# Patient Record
Sex: FEMALE | Race: ASIAN | NOT HISPANIC OR LATINO | URBAN - METROPOLITAN AREA
[De-identification: names, ages, dates, MRNs, and addresses within clinical notes are randomized per-mention and may not be internally consistent; named-entity substitution may affect disease eponyms.]

---

## 2018-01-31 ENCOUNTER — EMERGENCY (EMERGENCY)
Facility: HOSPITAL | Age: 28
LOS: 1 days | Discharge: HOME | End: 2018-01-31
Attending: EMERGENCY MEDICINE
Payer: SELF-PAY

## 2018-01-31 VITALS
RESPIRATION RATE: 16 BRPM | TEMPERATURE: 98 F | HEART RATE: 85 BPM | SYSTOLIC BLOOD PRESSURE: 110 MMHG | OXYGEN SATURATION: 100 % | DIASTOLIC BLOOD PRESSURE: 60 MMHG

## 2018-01-31 VITALS
WEIGHT: 149.91 LBS | DIASTOLIC BLOOD PRESSURE: 83 MMHG | TEMPERATURE: 98 F | OXYGEN SATURATION: 100 % | RESPIRATION RATE: 18 BRPM | HEIGHT: 64 IN | HEART RATE: 109 BPM | SYSTOLIC BLOOD PRESSURE: 128 MMHG

## 2018-01-31 DIAGNOSIS — Z98.890 OTHER SPECIFIED POSTPROCEDURAL STATES: Chronic | ICD-10-CM

## 2018-01-31 LAB
ANION GAP SERPL CALC-SCNC: 8 MMOL/L — SIGNIFICANT CHANGE UP (ref 5–17)
APPEARANCE UR: SIGNIFICANT CHANGE UP
BILIRUB UR-MCNC: ABNORMAL
BUN SERPL-MCNC: 5 MG/DL — LOW (ref 7–18)
CALCIUM SERPL-MCNC: 8.6 MG/DL — SIGNIFICANT CHANGE UP (ref 8.4–10.5)
CHLORIDE SERPL-SCNC: 105 MMOL/L — SIGNIFICANT CHANGE UP (ref 96–108)
CO2 SERPL-SCNC: 23 MMOL/L — SIGNIFICANT CHANGE UP (ref 22–31)
COLOR SPEC: YELLOW — SIGNIFICANT CHANGE UP
CREAT SERPL-MCNC: 0.5 MG/DL — SIGNIFICANT CHANGE UP (ref 0.5–1.3)
DIFF PNL FLD: ABNORMAL
GLUCOSE SERPL-MCNC: 92 MG/DL — SIGNIFICANT CHANGE UP (ref 70–99)
GLUCOSE UR QL: NEGATIVE — SIGNIFICANT CHANGE UP
HCG SERPL-ACNC: SIGNIFICANT CHANGE UP MIU/ML
HCG UR QL: POSITIVE
HCT VFR BLD CALC: 37.8 % — SIGNIFICANT CHANGE UP (ref 34.5–45)
HGB BLD-MCNC: 12.3 G/DL — SIGNIFICANT CHANGE UP (ref 11.5–15.5)
KETONES UR-MCNC: ABNORMAL
LEUKOCYTE ESTERASE UR-ACNC: ABNORMAL
MCHC RBC-ENTMCNC: 28.8 PG — SIGNIFICANT CHANGE UP (ref 27–34)
MCHC RBC-ENTMCNC: 32.6 GM/DL — SIGNIFICANT CHANGE UP (ref 32–36)
MCV RBC AUTO: 88.3 FL — SIGNIFICANT CHANGE UP (ref 80–100)
NITRITE UR-MCNC: NEGATIVE — SIGNIFICANT CHANGE UP
PH UR: 6.5 — SIGNIFICANT CHANGE UP (ref 5–8)
PLATELET # BLD AUTO: 306 K/UL — SIGNIFICANT CHANGE UP (ref 150–400)
POTASSIUM SERPL-MCNC: 3.4 MMOL/L — LOW (ref 3.5–5.3)
POTASSIUM SERPL-SCNC: 3.4 MMOL/L — LOW (ref 3.5–5.3)
PROT UR-MCNC: 15
RBC # BLD: 4.28 M/UL — SIGNIFICANT CHANGE UP (ref 3.8–5.2)
RBC # FLD: 12 % — SIGNIFICANT CHANGE UP (ref 10.3–14.5)
SODIUM SERPL-SCNC: 136 MMOL/L — SIGNIFICANT CHANGE UP (ref 135–145)
SP GR SPEC: 1.02 — SIGNIFICANT CHANGE UP (ref 1.01–1.02)
UROBILINOGEN FLD QL: 4
WBC # BLD: 9.9 K/UL — SIGNIFICANT CHANGE UP (ref 3.8–10.5)
WBC # FLD AUTO: 9.9 K/UL — SIGNIFICANT CHANGE UP (ref 3.8–10.5)

## 2018-01-31 PROCEDURE — 81025 URINE PREGNANCY TEST: CPT

## 2018-01-31 PROCEDURE — 80048 BASIC METABOLIC PNL TOTAL CA: CPT

## 2018-01-31 PROCEDURE — 76817 TRANSVAGINAL US OBSTETRIC: CPT

## 2018-01-31 PROCEDURE — 86900 BLOOD TYPING SEROLOGIC ABO: CPT

## 2018-01-31 PROCEDURE — 99285 EMERGENCY DEPT VISIT HI MDM: CPT

## 2018-01-31 PROCEDURE — 81001 URINALYSIS AUTO W/SCOPE: CPT

## 2018-01-31 PROCEDURE — 85027 COMPLETE CBC AUTOMATED: CPT

## 2018-01-31 PROCEDURE — 76815 OB US LIMITED FETUS(S): CPT | Mod: 26

## 2018-01-31 PROCEDURE — 76817 TRANSVAGINAL US OBSTETRIC: CPT | Mod: 26

## 2018-01-31 PROCEDURE — 86850 RBC ANTIBODY SCREEN: CPT

## 2018-01-31 PROCEDURE — 99284 EMERGENCY DEPT VISIT MOD MDM: CPT | Mod: 25

## 2018-01-31 PROCEDURE — 36415 COLL VENOUS BLD VENIPUNCTURE: CPT

## 2018-01-31 PROCEDURE — 84702 CHORIONIC GONADOTROPIN TEST: CPT

## 2018-01-31 PROCEDURE — 86901 BLOOD TYPING SEROLOGIC RH(D): CPT

## 2018-01-31 PROCEDURE — 76801 OB US < 14 WKS SINGLE FETUS: CPT | Mod: 26

## 2018-01-31 PROCEDURE — 76801 OB US < 14 WKS SINGLE FETUS: CPT

## 2018-01-31 RX ORDER — DOXYLAMINE SUCCINATE AND PYRIDOXINE HYDROCHLORIDE, DELAYED RELEASE TABLETS 10 MG/10 MG 10; 10 MG/1; MG/1
2 TABLET, DELAYED RELEASE ORAL
Qty: 6 | Refills: 0 | OUTPATIENT
Start: 2018-01-31 | End: 2018-02-02

## 2018-01-31 RX ORDER — ONDANSETRON 8 MG/1
4 TABLET, FILM COATED ORAL ONCE
Qty: 0 | Refills: 0 | Status: COMPLETED | OUTPATIENT
Start: 2018-01-31 | End: 2018-01-31

## 2018-01-31 RX ORDER — ONDANSETRON 8 MG/1
1 TABLET, FILM COATED ORAL
Qty: 9 | Refills: 0 | OUTPATIENT
Start: 2018-01-31 | End: 2018-02-02

## 2018-01-31 RX ADMIN — ONDANSETRON 4 MILLIGRAM(S): 8 TABLET, FILM COATED ORAL at 19:51

## 2018-01-31 NOTE — ED PROVIDER NOTE - PROGRESS NOTE DETAILS
x2 IUP at 7wks, will dc home with OB/GYN follow up. HCG >200,000, RHPOS, no UTI, pt vomited x1 while in ED, denies lightheaded rx diclegis, no insurance d/c and sent zofran, placed with Cassandra for follow up.

## 2018-01-31 NOTE — ED PROVIDER NOTE - MEDICAL DECISION MAKING DETAILS
27 y/o F pt presents with lower abd pain, fever, and vomiting. Pt is well-appearing, vital signs stable, afebrile. Sx's suggestive of possible pregnancy. Will check urine, and if positive, will send flu swab. Will also perform sonogram as pt with no previous confirmed IUP; will r/o ectopic for lower abdominal cramping.

## 2018-01-31 NOTE — ED PROVIDER NOTE - ATTENDING CONTRIBUTION TO CARE
28 year old female no PMHx c/o abdominal discomfort and vaginal spotting x one week. PE: NAD, mild suprapubic tenderness. I&P: threatened , bedrest, OB/GYN follow up 28 year old female no PMHx c/o abdominal discomfort and vaginal spotting x one week. PE: NAD, mild suprapubic tenderness. I&P: threatened , bedrest, OB/GYN follow up.

## 2018-01-31 NOTE — ED ADULT TRIAGE NOTE - CHIEF COMPLAINT QUOTE
C/o "I am vomiting and fever, lower abdominal pain and back pain". LMP " first week in December, I had a little spotting in between".

## 2018-01-31 NOTE — ED PROVIDER NOTE - OBJECTIVE STATEMENT
29 y/o F pt with no significant PMHx and PSHx of D&C (x2; approximately x5 years ago, performed in [place]) presents with  to ED c/o subjective fever and vomiting with associated lower abdominal pain x3 days. Pt additionally reports vaginal spotting x1 week ago, which has since resolved. Pt describes vomiting as worse in the morning; pt describes her abdominal pain as cramping in nature. Pt notes possibility of pregnancy. Pt denies vaginal pain, dysuria, hematuria, cough, b/l ear pain, throat pain, or any other complaints. Pt also denies Hx of DM, Hx of HTN, having received the flu vaccination this year, currently taking birth control PO daily, or having recently performed a pregnancy test. LMP: early December of 2017; pt notes her menstrual cycle is usually regular. NKDA.

## 2018-01-31 NOTE — ED PROVIDER NOTE - CARE PLAN
Principal Discharge DX:	Vaginal bleeding in pregnancy, first trimester Principal Discharge DX:	Abdominal cramping, bilateral lower quadrant  Secondary Diagnosis:	Pregnancy

## 2018-03-05 ENCOUNTER — RESULT REVIEW (OUTPATIENT)
Age: 28
End: 2018-03-05

## 2018-03-05 ENCOUNTER — OUTPATIENT (OUTPATIENT)
Dept: OUTPATIENT SERVICES | Facility: HOSPITAL | Age: 28
LOS: 1 days | End: 2018-03-05
Payer: MEDICAID

## 2018-03-05 ENCOUNTER — APPOINTMENT (OUTPATIENT)
Dept: OBGYN | Facility: CLINIC | Age: 28
End: 2018-03-05
Payer: MEDICAID

## 2018-03-05 ENCOUNTER — NON-APPOINTMENT (OUTPATIENT)
Age: 28
End: 2018-03-05

## 2018-03-05 VITALS
DIASTOLIC BLOOD PRESSURE: 84 MMHG | HEIGHT: 62 IN | WEIGHT: 138 LBS | SYSTOLIC BLOOD PRESSURE: 126 MMHG | BODY MASS INDEX: 25.4 KG/M2

## 2018-03-05 DIAGNOSIS — Z34.00 ENCOUNTER FOR SUPERVISION OF NORMAL FIRST PREGNANCY, UNSPECIFIED TRIMESTER: ICD-10-CM

## 2018-03-05 DIAGNOSIS — Z81.8 FAMILY HISTORY OF OTHER MENTAL AND BEHAVIORAL DISORDERS: ICD-10-CM

## 2018-03-05 DIAGNOSIS — Z82.49 FAMILY HISTORY OF ISCHEMIC HEART DISEASE AND OTHER DISEASES OF THE CIRCULATORY SYSTEM: ICD-10-CM

## 2018-03-05 DIAGNOSIS — E03.9 HYPOTHYROIDISM, UNSPECIFIED: ICD-10-CM

## 2018-03-05 DIAGNOSIS — Z98.890 OTHER SPECIFIED POSTPROCEDURAL STATES: Chronic | ICD-10-CM

## 2018-03-05 LAB
APPEARANCE UR: ABNORMAL
BACTERIA # UR AUTO: ABNORMAL
BASOPHILS # BLD AUTO: 0.02 K/UL — SIGNIFICANT CHANGE UP (ref 0–0.2)
BASOPHILS NFR BLD AUTO: 0.3 % — SIGNIFICANT CHANGE UP (ref 0–2)
BILIRUB UR-MCNC: NEGATIVE — SIGNIFICANT CHANGE UP
COLOR SPEC: YELLOW — SIGNIFICANT CHANGE UP
COMMENT - URINE: SIGNIFICANT CHANGE UP
DIFF PNL FLD: NEGATIVE — SIGNIFICANT CHANGE UP
EOSINOPHIL # BLD AUTO: 0.05 K/UL — SIGNIFICANT CHANGE UP (ref 0–0.5)
EOSINOPHIL NFR BLD AUTO: 0.6 % — SIGNIFICANT CHANGE UP (ref 0–6)
EPI CELLS # UR: 20 /HPF — HIGH (ref 0–5)
GLUCOSE UR QL: NEGATIVE MG/DL — SIGNIFICANT CHANGE UP
HBA1C BLD-MCNC: 4.9 % — SIGNIFICANT CHANGE UP (ref 4–5.6)
HBV SURFACE AG SER-ACNC: SIGNIFICANT CHANGE UP
HCT VFR BLD CALC: 33.5 % — LOW (ref 34.5–45)
HGB BLD-MCNC: 11.3 G/DL — LOW (ref 11.5–15.5)
HYALINE CASTS # UR AUTO: 0 /LPF — SIGNIFICANT CHANGE UP (ref 0–7)
IMM GRANULOCYTES NFR BLD AUTO: 0.3 % — SIGNIFICANT CHANGE UP (ref 0–1.5)
KETONES UR-MCNC: NEGATIVE — SIGNIFICANT CHANGE UP
LEUKOCYTE ESTERASE UR-ACNC: ABNORMAL
LYMPHOCYTES # BLD AUTO: 1.79 K/UL — SIGNIFICANT CHANGE UP (ref 1–3.3)
LYMPHOCYTES # BLD AUTO: 22.6 % — SIGNIFICANT CHANGE UP (ref 13–44)
MCHC RBC-ENTMCNC: 29.4 PG — SIGNIFICANT CHANGE UP (ref 27–34)
MCHC RBC-ENTMCNC: 33.7 GM/DL — SIGNIFICANT CHANGE UP (ref 32–36)
MCV RBC AUTO: 87 FL — SIGNIFICANT CHANGE UP (ref 80–100)
MONOCYTES # BLD AUTO: 0.57 K/UL — SIGNIFICANT CHANGE UP (ref 0–0.9)
MONOCYTES NFR BLD AUTO: 7.2 % — SIGNIFICANT CHANGE UP (ref 2–14)
NEUTROPHILS # BLD AUTO: 5.46 K/UL — SIGNIFICANT CHANGE UP (ref 1.8–7.4)
NEUTROPHILS NFR BLD AUTO: 69 % — SIGNIFICANT CHANGE UP (ref 43–77)
NITRITE UR-MCNC: NEGATIVE — SIGNIFICANT CHANGE UP
PH UR: 7 — SIGNIFICANT CHANGE UP (ref 5–8)
PLATELET # BLD AUTO: 286 K/UL — SIGNIFICANT CHANGE UP (ref 150–400)
PROT UR-MCNC: NEGATIVE MG/DL — SIGNIFICANT CHANGE UP
RBC # BLD: 3.85 M/UL — SIGNIFICANT CHANGE UP (ref 3.8–5.2)
RBC # FLD: 13.8 % — SIGNIFICANT CHANGE UP (ref 10.3–14.5)
RBC CASTS # UR COMP ASSIST: 5 /HPF — HIGH (ref 0–4)
RUBV IGG SER-ACNC: 2.8 INDEX — SIGNIFICANT CHANGE UP
RUBV IGG SER-IMP: POSITIVE — SIGNIFICANT CHANGE UP
SP GR SPEC: 1.01 — LOW (ref 1.01–1.02)
T PALLIDUM AB TITR SER: NEGATIVE — SIGNIFICANT CHANGE UP
T4 FREE SERPL-MCNC: 1.6 NG/DL — SIGNIFICANT CHANGE UP (ref 0.9–1.8)
TSH SERPL-MCNC: 0.14 UIU/ML — LOW (ref 0.27–4.2)
UROBILINOGEN FLD QL: NEGATIVE MG/DL — SIGNIFICANT CHANGE UP
VZV IGG FLD QL IA: 400.6 INDEX — SIGNIFICANT CHANGE UP
VZV IGG FLD QL IA: POSITIVE — SIGNIFICANT CHANGE UP
WBC # BLD: 7.91 K/UL — SIGNIFICANT CHANGE UP (ref 3.8–10.5)
WBC # FLD AUTO: 7.91 K/UL — SIGNIFICANT CHANGE UP (ref 3.8–10.5)
WBC UR QL: 8 /HPF — HIGH (ref 0–5)

## 2018-03-05 PROCEDURE — 86850 RBC ANTIBODY SCREEN: CPT

## 2018-03-05 PROCEDURE — 81001 URINALYSIS AUTO W/SCOPE: CPT

## 2018-03-05 PROCEDURE — 99203 OFFICE O/P NEW LOW 30 MIN: CPT | Mod: NC

## 2018-03-05 PROCEDURE — 86787 VARICELLA-ZOSTER ANTIBODY: CPT

## 2018-03-05 PROCEDURE — 86901 BLOOD TYPING SEROLOGIC RH(D): CPT

## 2018-03-05 PROCEDURE — 81099 UNLISTED URINALYSIS PX: CPT | Mod: NC

## 2018-03-05 PROCEDURE — 81220 CFTR GENE COM VARIANTS: CPT

## 2018-03-05 PROCEDURE — 81003 URINALYSIS AUTO W/O SCOPE: CPT

## 2018-03-05 PROCEDURE — 36415 COLL VENOUS BLD VENIPUNCTURE: CPT | Mod: NC

## 2018-03-05 PROCEDURE — 86780 TREPONEMA PALLIDUM: CPT

## 2018-03-05 PROCEDURE — 81025 URINE PREGNANCY TEST: CPT | Mod: NC

## 2018-03-05 PROCEDURE — 87389 HIV-1 AG W/HIV-1&-2 AB AG IA: CPT

## 2018-03-05 PROCEDURE — 85027 COMPLETE CBC AUTOMATED: CPT

## 2018-03-05 PROCEDURE — 88175 CYTOPATH C/V AUTO FLUID REDO: CPT

## 2018-03-05 PROCEDURE — 81025 URINE PREGNANCY TEST: CPT

## 2018-03-05 PROCEDURE — 86480 TB TEST CELL IMMUN MEASURE: CPT

## 2018-03-05 PROCEDURE — 87086 URINE CULTURE/COLONY COUNT: CPT

## 2018-03-05 PROCEDURE — 83655 ASSAY OF LEAD: CPT

## 2018-03-05 PROCEDURE — 84439 ASSAY OF FREE THYROXINE: CPT

## 2018-03-05 PROCEDURE — 36415 COLL VENOUS BLD VENIPUNCTURE: CPT

## 2018-03-05 PROCEDURE — 87340 HEPATITIS B SURFACE AG IA: CPT

## 2018-03-05 PROCEDURE — 86900 BLOOD TYPING SEROLOGIC ABO: CPT

## 2018-03-05 PROCEDURE — G0463: CPT

## 2018-03-05 PROCEDURE — 83020 HEMOGLOBIN ELECTROPHORESIS: CPT | Mod: 26

## 2018-03-05 PROCEDURE — 83036 HEMOGLOBIN GLYCOSYLATED A1C: CPT

## 2018-03-05 PROCEDURE — 83020 HEMOGLOBIN ELECTROPHORESIS: CPT

## 2018-03-05 PROCEDURE — 86762 RUBELLA ANTIBODY: CPT

## 2018-03-05 PROCEDURE — 84443 ASSAY THYROID STIM HORMONE: CPT

## 2018-03-06 DIAGNOSIS — Z34.01 ENCOUNTER FOR SUPERVISION OF NORMAL FIRST PREGNANCY, FIRST TRIMESTER: ICD-10-CM

## 2018-03-06 DIAGNOSIS — E03.9 HYPOTHYROIDISM, UNSPECIFIED: ICD-10-CM

## 2018-03-06 DIAGNOSIS — O30.009 TWIN PREGNANCY, UNSPECIFIED NUMBER OF PLACENTA AND UNSPECIFIED NUMBER OF AMNIOTIC SACS, UNSPECIFIED TRIMESTER: ICD-10-CM

## 2018-03-06 DIAGNOSIS — Z3A.12 12 WEEKS GESTATION OF PREGNANCY: ICD-10-CM

## 2018-03-06 LAB
C TRACH RRNA SPEC QL NAA+PROBE: SIGNIFICANT CHANGE UP
C TRACH+GC RRNA SPEC QL PROBE: SIGNIFICANT CHANGE UP
CULTURE RESULTS: SIGNIFICANT CHANGE UP
HIV 1+2 AB+HIV1 P24 AG SERPL QL IA: SIGNIFICANT CHANGE UP
N GONORRHOEA RRNA SPEC QL NAA+PROBE: SIGNIFICANT CHANGE UP
SPECIMEN SOURCE: SIGNIFICANT CHANGE UP

## 2018-03-07 LAB
HEMOGLOBIN INTERPRETATION: SIGNIFICANT CHANGE UP
HGB A MFR BLD: 96.7 % — SIGNIFICANT CHANGE UP (ref 95.8–98)
HGB A2 MFR BLD: 2.1 % — SIGNIFICANT CHANGE UP (ref 2–3.2)
HGB F MFR BLD: 1.2 % — HIGH (ref 0–1)
M TB TUBERC IFN-G BLD QL: 0.03 IU/ML — SIGNIFICANT CHANGE UP
M TB TUBERC IFN-G BLD QL: >10 IU/ML — SIGNIFICANT CHANGE UP
M TB TUBERC IFN-G BLD QL: POSITIVE
MITOGEN IGNF BCKGRD COR BLD-ACNC: >10 IU/ML — SIGNIFICANT CHANGE UP

## 2018-03-08 LAB — LEAD BLD-MCNC: 2 UG/DL — SIGNIFICANT CHANGE UP (ref 0–4)

## 2018-03-09 LAB — CYTOLOGY SPEC DOC CYTO: SIGNIFICANT CHANGE UP

## 2018-03-12 ENCOUNTER — ASOB RESULT (OUTPATIENT)
Age: 28
End: 2018-03-12

## 2018-03-12 ENCOUNTER — APPOINTMENT (OUTPATIENT)
Dept: ANTEPARTUM | Facility: CLINIC | Age: 28
End: 2018-03-12
Payer: MEDICAID

## 2018-03-12 LAB — CYSTIC FIBROSIS EXPANDED PANEL: SIGNIFICANT CHANGE UP

## 2018-03-12 PROCEDURE — 76814 OB US NUCHAL MEAS ADD-ON: CPT

## 2018-03-12 PROCEDURE — 76813 OB US NUCHAL MEAS 1 GEST: CPT

## 2018-03-12 PROCEDURE — 36416 COLLJ CAPILLARY BLOOD SPEC: CPT

## 2018-03-12 PROCEDURE — 76801 OB US < 14 WKS SINGLE FETUS: CPT

## 2018-03-12 PROCEDURE — 76802 OB US < 14 WKS ADDL FETUS: CPT

## 2018-03-12 PROCEDURE — 99203 OFFICE O/P NEW LOW 30 MIN: CPT | Mod: 25

## 2018-03-22 ENCOUNTER — TRANSCRIPTION ENCOUNTER (OUTPATIENT)
Age: 28
End: 2018-03-22

## 2018-04-02 ENCOUNTER — OUTPATIENT (OUTPATIENT)
Dept: OUTPATIENT SERVICES | Facility: HOSPITAL | Age: 28
LOS: 1 days | End: 2018-04-02
Payer: MEDICAID

## 2018-04-02 ENCOUNTER — NON-APPOINTMENT (OUTPATIENT)
Age: 28
End: 2018-04-02

## 2018-04-02 ENCOUNTER — APPOINTMENT (OUTPATIENT)
Dept: OBGYN | Facility: CLINIC | Age: 28
End: 2018-04-02
Payer: MEDICAID

## 2018-04-02 VITALS
WEIGHT: 142 LBS | DIASTOLIC BLOOD PRESSURE: 78 MMHG | HEIGHT: 62 IN | SYSTOLIC BLOOD PRESSURE: 115 MMHG | BODY MASS INDEX: 26.13 KG/M2

## 2018-04-02 DIAGNOSIS — Z3A.12 12 WEEKS GESTATION OF PREGNANCY: ICD-10-CM

## 2018-04-02 DIAGNOSIS — Z98.890 OTHER SPECIFIED POSTPROCEDURAL STATES: Chronic | ICD-10-CM

## 2018-04-02 DIAGNOSIS — Z34.01 ENCOUNTER FOR SUPERVISION OF NORMAL FIRST PREGNANCY, FIRST TRIMESTER: ICD-10-CM

## 2018-04-02 DIAGNOSIS — Z34.00 ENCOUNTER FOR SUPERVISION OF NORMAL FIRST PREGNANCY, UNSPECIFIED TRIMESTER: ICD-10-CM

## 2018-04-02 LAB
ALBUMIN SERPL ELPH-MCNC: 3.8 G/DL — SIGNIFICANT CHANGE UP (ref 3.3–5)
ALP SERPL-CCNC: 67 U/L — SIGNIFICANT CHANGE UP (ref 40–120)
ALT FLD-CCNC: 21 U/L — SIGNIFICANT CHANGE UP (ref 10–45)
ANION GAP SERPL CALC-SCNC: 11 MMOL/L — SIGNIFICANT CHANGE UP (ref 5–17)
AST SERPL-CCNC: 24 U/L — SIGNIFICANT CHANGE UP (ref 10–40)
BILIRUB SERPL-MCNC: 0.2 MG/DL — SIGNIFICANT CHANGE UP (ref 0.2–1.2)
BUN SERPL-MCNC: 5 MG/DL — LOW (ref 7–23)
CALCIUM SERPL-MCNC: 9.8 MG/DL — SIGNIFICANT CHANGE UP (ref 8.4–10.5)
CHLORIDE SERPL-SCNC: 104 MMOL/L — SIGNIFICANT CHANGE UP (ref 96–108)
CO2 SERPL-SCNC: 23 MMOL/L — SIGNIFICANT CHANGE UP (ref 22–31)
CREAT SERPL-MCNC: 0.56 MG/DL — SIGNIFICANT CHANGE UP (ref 0.5–1.3)
GLUCOSE SERPL-MCNC: 74 MG/DL — SIGNIFICANT CHANGE UP (ref 70–99)
INR BLD: 1.01 RATIO — SIGNIFICANT CHANGE UP (ref 0.88–1.16)
LDH SERPL L TO P-CCNC: 176 U/L — SIGNIFICANT CHANGE UP (ref 50–242)
POTASSIUM SERPL-MCNC: 3.7 MMOL/L — SIGNIFICANT CHANGE UP (ref 3.5–5.3)
POTASSIUM SERPL-SCNC: 3.7 MMOL/L — SIGNIFICANT CHANGE UP (ref 3.5–5.3)
PROT SERPL-MCNC: 7.1 G/DL — SIGNIFICANT CHANGE UP (ref 6–8.3)
PROTHROM AB SERPL-ACNC: 11.4 SEC — SIGNIFICANT CHANGE UP (ref 10–13.1)
SODIUM SERPL-SCNC: 138 MMOL/L — SIGNIFICANT CHANGE UP (ref 135–145)
TSH SERPL-MCNC: 2.82 UIU/ML — SIGNIFICANT CHANGE UP (ref 0.27–4.2)

## 2018-04-02 PROCEDURE — 81003 URINALYSIS AUTO W/O SCOPE: CPT

## 2018-04-02 PROCEDURE — 36415 COLL VENOUS BLD VENIPUNCTURE: CPT

## 2018-04-02 PROCEDURE — G0463: CPT

## 2018-04-02 PROCEDURE — 36415 COLL VENOUS BLD VENIPUNCTURE: CPT | Mod: NC

## 2018-04-02 PROCEDURE — 83615 LACTATE (LD) (LDH) ENZYME: CPT

## 2018-04-02 PROCEDURE — 81003 URINALYSIS AUTO W/O SCOPE: CPT | Mod: QW,NC

## 2018-04-02 PROCEDURE — 82105 ALPHA-FETOPROTEIN SERUM: CPT

## 2018-04-02 PROCEDURE — 84443 ASSAY THYROID STIM HORMONE: CPT

## 2018-04-02 PROCEDURE — 80053 COMPREHEN METABOLIC PANEL: CPT

## 2018-04-02 PROCEDURE — 84704 HCG FREE BETACHAIN TEST: CPT

## 2018-04-02 PROCEDURE — 99213 OFFICE O/P EST LOW 20 MIN: CPT

## 2018-04-02 PROCEDURE — 85610 PROTHROMBIN TIME: CPT

## 2018-04-02 PROCEDURE — 86336 INHIBIN A: CPT

## 2018-04-02 PROCEDURE — 82677 ASSAY OF ESTRIOL: CPT

## 2018-04-04 DIAGNOSIS — E03.9 HYPOTHYROIDISM, UNSPECIFIED: ICD-10-CM

## 2018-04-04 DIAGNOSIS — O30.042 TWIN PREGNANCY, DICHORIONIC/DIAMNIOTIC, SECOND TRIMESTER: ICD-10-CM

## 2018-04-04 DIAGNOSIS — R76.12 NONSPECIFIC REACTION TO CELL MEDIATED IMMUNITY MEASUREMENT OF GAMMA INTERFERON ANTIGEN RESPONSE WITHOUT ACTIVE TUBERCULOSIS: ICD-10-CM

## 2018-04-04 LAB
1ST TRIMESTER DATA: SIGNIFICANT CHANGE UP
2ND TRIMESTER DATA: SIGNIFICANT CHANGE UP
ADDENDUM DOC: SIGNIFICANT CHANGE UP
AFP AMN-MCNC: SIGNIFICANT CHANGE UP
AFP SERPL-ACNC: SIGNIFICANT CHANGE UP
B-HCG FREE SERPL-MCNC: SIGNIFICANT CHANGE UP
B-HCG FREE SERPL-MCNC: SIGNIFICANT CHANGE UP
CLINICAL BIOCHEMIST REVIEW: SIGNIFICANT CHANGE UP
DEMOGRAPHIC DATA: SIGNIFICANT CHANGE UP
INHIBIN A SERPL-MCNC: SIGNIFICANT CHANGE UP
NT - TWINB: SIGNIFICANT CHANGE UP
NT: SIGNIFICANT CHANGE UP
PAPP-A SERPL-ACNC: SIGNIFICANT CHANGE UP
SCREEN - COMMENTS TWIN B: SIGNIFICANT CHANGE UP
SCREEN - FOOTER TWIN B: SIGNIFICANT CHANGE UP
SCREEN-FOOTER: SIGNIFICANT CHANGE UP
TOTAL ONTDS/VWD TWIN B: SIGNIFICANT CHANGE UP
U ESTRIOL SERPL-SCNC: SIGNIFICANT CHANGE UP

## 2018-04-09 ENCOUNTER — APPOINTMENT (OUTPATIENT)
Dept: ANTEPARTUM | Facility: CLINIC | Age: 28
End: 2018-04-09
Payer: MEDICAID

## 2018-04-09 ENCOUNTER — OUTPATIENT (OUTPATIENT)
Dept: OUTPATIENT SERVICES | Facility: HOSPITAL | Age: 28
LOS: 1 days | End: 2018-04-09
Payer: MEDICAID

## 2018-04-09 ENCOUNTER — ASOB RESULT (OUTPATIENT)
Age: 28
End: 2018-04-09

## 2018-04-09 DIAGNOSIS — Z98.890 OTHER SPECIFIED POSTPROCEDURAL STATES: Chronic | ICD-10-CM

## 2018-04-09 DIAGNOSIS — Z34.90 ENCOUNTER FOR SUPERVISION OF NORMAL PREGNANCY, UNSPECIFIED, UNSPECIFIED TRIMESTER: ICD-10-CM

## 2018-04-09 LAB
BODY SURFACE AREA CALCULATION: 1.73 M2 — SIGNIFICANT CHANGE UP
COLLECT DURATION TIME UR: 24 HR — SIGNIFICANT CHANGE UP
TOTAL VOLUME - 24 HOUR: 3100 ML — SIGNIFICANT CHANGE UP
URINE CREATININE CALCULATION: 0.8 G/24 H — SIGNIFICANT CHANGE UP (ref 0.8–1.8)

## 2018-04-09 PROCEDURE — 84560 ASSAY OF URINE/URIC ACID: CPT

## 2018-04-09 PROCEDURE — 82570 ASSAY OF URINE CREATININE: CPT

## 2018-04-09 PROCEDURE — 82575 CREATININE CLEARANCE TEST: CPT

## 2018-04-09 PROCEDURE — 84156 ASSAY OF PROTEIN URINE: CPT

## 2018-04-09 PROCEDURE — 76805 OB US >/= 14 WKS SNGL FETUS: CPT

## 2018-04-09 PROCEDURE — 76810 OB US >/= 14 WKS ADDL FETUS: CPT

## 2018-04-10 LAB
PROT 24H UR-MRATE: 310 MG/24 H — HIGH (ref 50–100)
URATE 24H UR-MRATE: 378 MG/24HR — SIGNIFICANT CHANGE UP (ref 250–750)

## 2018-04-11 LAB
CREAT CL ?TM UR+SERPL-VRATE: SIGNIFICANT CHANGE UP ML/MIN (ref 75–115)
CREAT SERPL-MCNC: SIGNIFICANT CHANGE UP MG/DL (ref 0.5–1.3)

## 2018-04-12 ENCOUNTER — MEDICATION RENEWAL (OUTPATIENT)
Age: 28
End: 2018-04-12

## 2018-04-30 ENCOUNTER — OUTPATIENT (OUTPATIENT)
Dept: OUTPATIENT SERVICES | Facility: HOSPITAL | Age: 28
LOS: 1 days | End: 2018-04-30
Payer: MEDICAID

## 2018-04-30 ENCOUNTER — NON-APPOINTMENT (OUTPATIENT)
Age: 28
End: 2018-04-30

## 2018-04-30 ENCOUNTER — APPOINTMENT (OUTPATIENT)
Dept: OBGYN | Facility: CLINIC | Age: 28
End: 2018-04-30
Payer: MEDICAID

## 2018-04-30 VITALS
BODY MASS INDEX: 25.1 KG/M2 | DIASTOLIC BLOOD PRESSURE: 74 MMHG | HEIGHT: 64 IN | SYSTOLIC BLOOD PRESSURE: 122 MMHG | WEIGHT: 147 LBS

## 2018-04-30 DIAGNOSIS — Z98.890 OTHER SPECIFIED POSTPROCEDURAL STATES: Chronic | ICD-10-CM

## 2018-04-30 DIAGNOSIS — Z34.00 ENCOUNTER FOR SUPERVISION OF NORMAL FIRST PREGNANCY, UNSPECIFIED TRIMESTER: ICD-10-CM

## 2018-04-30 PROCEDURE — 81003 URINALYSIS AUTO W/O SCOPE: CPT | Mod: QW,NC

## 2018-04-30 PROCEDURE — G0463: CPT

## 2018-04-30 PROCEDURE — 99213 OFFICE O/P EST LOW 20 MIN: CPT

## 2018-04-30 PROCEDURE — 81003 URINALYSIS AUTO W/O SCOPE: CPT

## 2018-05-02 DIAGNOSIS — R76.12 NONSPECIFIC REACTION TO CELL MEDIATED IMMUNITY MEASUREMENT OF GAMMA INTERFERON ANTIGEN RESPONSE WITHOUT ACTIVE TUBERCULOSIS: ICD-10-CM

## 2018-05-02 DIAGNOSIS — E03.9 HYPOTHYROIDISM, UNSPECIFIED: ICD-10-CM

## 2018-05-02 DIAGNOSIS — O30.042 TWIN PREGNANCY, DICHORIONIC/DIAMNIOTIC, SECOND TRIMESTER: ICD-10-CM

## 2018-05-02 DIAGNOSIS — Z34.92 ENCOUNTER FOR SUPERVISION OF NORMAL PREGNANCY, UNSPECIFIED, SECOND TRIMESTER: ICD-10-CM

## 2018-05-02 DIAGNOSIS — R21 RASH AND OTHER NONSPECIFIC SKIN ERUPTION: ICD-10-CM

## 2018-05-03 ENCOUNTER — ASOB RESULT (OUTPATIENT)
Age: 28
End: 2018-05-03

## 2018-05-03 ENCOUNTER — APPOINTMENT (OUTPATIENT)
Dept: ANTEPARTUM | Facility: CLINIC | Age: 28
End: 2018-05-03
Payer: MEDICAID

## 2018-05-03 PROCEDURE — 76812 OB US DETAILED ADDL FETUS: CPT

## 2018-05-03 PROCEDURE — 76811 OB US DETAILED SNGL FETUS: CPT

## 2018-05-17 ENCOUNTER — ASOB RESULT (OUTPATIENT)
Age: 28
End: 2018-05-17

## 2018-05-17 ENCOUNTER — APPOINTMENT (OUTPATIENT)
Dept: ANTEPARTUM | Facility: CLINIC | Age: 28
End: 2018-05-17
Payer: MEDICAID

## 2018-05-17 PROCEDURE — 76817 TRANSVAGINAL US OBSTETRIC: CPT

## 2018-05-17 PROCEDURE — 76816 OB US FOLLOW-UP PER FETUS: CPT | Mod: 59

## 2018-05-30 ENCOUNTER — NON-APPOINTMENT (OUTPATIENT)
Age: 28
End: 2018-05-30

## 2018-05-30 ENCOUNTER — OUTPATIENT (OUTPATIENT)
Dept: OUTPATIENT SERVICES | Facility: HOSPITAL | Age: 28
LOS: 1 days | End: 2018-05-30
Payer: MEDICAID

## 2018-05-30 ENCOUNTER — APPOINTMENT (OUTPATIENT)
Dept: OBGYN | Facility: CLINIC | Age: 28
End: 2018-05-30
Payer: MEDICAID

## 2018-05-30 VITALS
SYSTOLIC BLOOD PRESSURE: 116 MMHG | WEIGHT: 151 LBS | HEIGHT: 64 IN | BODY MASS INDEX: 25.78 KG/M2 | DIASTOLIC BLOOD PRESSURE: 78 MMHG

## 2018-05-30 DIAGNOSIS — Z98.890 OTHER SPECIFIED POSTPROCEDURAL STATES: Chronic | ICD-10-CM

## 2018-05-30 DIAGNOSIS — O30.009 TWIN PREGNANCY, UNSPECIFIED NUMBER OF PLACENTA AND UNSPECIFIED NUMBER OF AMNIOTIC SACS, UNSPECIFIED TRIMESTER: ICD-10-CM

## 2018-05-30 DIAGNOSIS — Z34.00 ENCOUNTER FOR SUPERVISION OF NORMAL FIRST PREGNANCY, UNSPECIFIED TRIMESTER: ICD-10-CM

## 2018-05-30 DIAGNOSIS — R21 RASH AND OTHER NONSPECIFIC SKIN ERUPTION: ICD-10-CM

## 2018-05-30 PROCEDURE — 82950 GLUCOSE TEST: CPT

## 2018-05-30 PROCEDURE — 84439 ASSAY OF FREE THYROXINE: CPT

## 2018-05-30 PROCEDURE — 81003 URINALYSIS AUTO W/O SCOPE: CPT

## 2018-05-30 PROCEDURE — 99213 OFFICE O/P EST LOW 20 MIN: CPT

## 2018-05-30 PROCEDURE — 85027 COMPLETE CBC AUTOMATED: CPT

## 2018-05-30 PROCEDURE — G0463: CPT

## 2018-05-30 PROCEDURE — 84443 ASSAY THYROID STIM HORMONE: CPT

## 2018-05-31 ENCOUNTER — ASOB RESULT (OUTPATIENT)
Age: 28
End: 2018-05-31

## 2018-05-31 ENCOUNTER — INPATIENT (INPATIENT)
Facility: HOSPITAL | Age: 28
LOS: 1 days | Discharge: HOME | DRG: 781 | End: 2018-06-02
Attending: OBSTETRICS & GYNECOLOGY | Admitting: OBSTETRICS & GYNECOLOGY
Payer: MEDICAID

## 2018-05-31 ENCOUNTER — APPOINTMENT (OUTPATIENT)
Dept: ANTEPARTUM | Facility: CLINIC | Age: 28
End: 2018-05-31
Payer: MEDICAID

## 2018-05-31 VITALS — WEIGHT: 149.91 LBS | HEIGHT: 64 IN

## 2018-05-31 DIAGNOSIS — Z98.890 OTHER SPECIFIED POSTPROCEDURAL STATES: Chronic | ICD-10-CM

## 2018-05-31 DIAGNOSIS — O26.899 OTHER SPECIFIED PREGNANCY RELATED CONDITIONS, UNSPECIFIED TRIMESTER: ICD-10-CM

## 2018-05-31 DIAGNOSIS — Z3A.00 WEEKS OF GESTATION OF PREGNANCY NOT SPECIFIED: ICD-10-CM

## 2018-05-31 DIAGNOSIS — Z34.80 ENCOUNTER FOR SUPERVISION OF OTHER NORMAL PREGNANCY, UNSPECIFIED TRIMESTER: ICD-10-CM

## 2018-05-31 LAB
ALBUMIN SERPL ELPH-MCNC: 3.7 G/DL — SIGNIFICANT CHANGE UP (ref 3.3–5)
ALP SERPL-CCNC: 120 U/L — SIGNIFICANT CHANGE UP (ref 40–120)
ALT FLD-CCNC: 16 U/L — SIGNIFICANT CHANGE UP (ref 10–45)
ANION GAP SERPL CALC-SCNC: 11 MMOL/L — SIGNIFICANT CHANGE UP (ref 5–17)
APPEARANCE UR: CLEAR — SIGNIFICANT CHANGE UP
AST SERPL-CCNC: 19 U/L — SIGNIFICANT CHANGE UP (ref 10–40)
BACTERIA # UR AUTO: ABNORMAL /HPF
BASOPHILS # BLD AUTO: 0.02 K/UL — SIGNIFICANT CHANGE UP (ref 0–0.2)
BASOPHILS # BLD AUTO: 0.1 K/UL — SIGNIFICANT CHANGE UP (ref 0–0.2)
BASOPHILS NFR BLD AUTO: 0.2 % — SIGNIFICANT CHANGE UP (ref 0–2)
BASOPHILS NFR BLD AUTO: 0.5 % — SIGNIFICANT CHANGE UP (ref 0–2)
BILIRUB SERPL-MCNC: 0.2 MG/DL — SIGNIFICANT CHANGE UP (ref 0.2–1.2)
BILIRUB UR-MCNC: NEGATIVE — SIGNIFICANT CHANGE UP
BLD GP AB SCN SERPL QL: NEGATIVE — SIGNIFICANT CHANGE UP
BUN SERPL-MCNC: <4 MG/DL — LOW (ref 7–23)
CALCIUM SERPL-MCNC: 9.4 MG/DL — SIGNIFICANT CHANGE UP (ref 8.4–10.5)
CHLORIDE SERPL-SCNC: 104 MMOL/L — SIGNIFICANT CHANGE UP (ref 96–108)
CO2 SERPL-SCNC: 24 MMOL/L — SIGNIFICANT CHANGE UP (ref 22–31)
COLOR SPEC: SIGNIFICANT CHANGE UP
CREAT SERPL-MCNC: 0.54 MG/DL — SIGNIFICANT CHANGE UP (ref 0.5–1.3)
DIFF PNL FLD: NEGATIVE — SIGNIFICANT CHANGE UP
EOSINOPHIL # BLD AUTO: 0.4 K/UL — SIGNIFICANT CHANGE UP (ref 0–0.5)
EOSINOPHIL # BLD AUTO: 0.4 K/UL — SIGNIFICANT CHANGE UP (ref 0–0.5)
EOSINOPHIL NFR BLD AUTO: 3.3 % — SIGNIFICANT CHANGE UP (ref 0–6)
EOSINOPHIL NFR BLD AUTO: 3.8 % — SIGNIFICANT CHANGE UP (ref 0–6)
EPI CELLS # UR: SIGNIFICANT CHANGE UP /HPF
GLUCOSE 1H P GLC SERPL-MCNC: 99 MG/DL — SIGNIFICANT CHANGE UP (ref 70–134)
GLUCOSE SERPL-MCNC: 80 MG/DL — SIGNIFICANT CHANGE UP (ref 70–99)
GLUCOSE UR QL: NEGATIVE — SIGNIFICANT CHANGE UP
HCT VFR BLD CALC: 30.7 % — LOW (ref 34.5–45)
HCT VFR BLD CALC: 31.7 % — LOW (ref 34.5–45)
HGB BLD-MCNC: 10.3 G/DL — LOW (ref 11.5–15.5)
HGB BLD-MCNC: 10.7 G/DL — LOW (ref 11.5–15.5)
IMM GRANULOCYTES NFR BLD AUTO: 0.8 % — SIGNIFICANT CHANGE UP (ref 0–1.5)
KETONES UR-MCNC: NEGATIVE — SIGNIFICANT CHANGE UP
LEUKOCYTE ESTERASE UR-ACNC: ABNORMAL
LYMPHOCYTES # BLD AUTO: 2.29 K/UL — SIGNIFICANT CHANGE UP (ref 1–3.3)
LYMPHOCYTES # BLD AUTO: 2.5 K/UL — SIGNIFICANT CHANGE UP (ref 1–3.3)
LYMPHOCYTES # BLD AUTO: 21.6 % — SIGNIFICANT CHANGE UP (ref 13–44)
LYMPHOCYTES # BLD AUTO: 23.1 % — SIGNIFICANT CHANGE UP (ref 13–44)
MCHC RBC-ENTMCNC: 29.8 PG — SIGNIFICANT CHANGE UP (ref 27–34)
MCHC RBC-ENTMCNC: 31.5 PG — SIGNIFICANT CHANGE UP (ref 27–34)
MCHC RBC-ENTMCNC: 32.5 GM/DL — SIGNIFICANT CHANGE UP (ref 32–36)
MCHC RBC-ENTMCNC: 34.8 GM/DL — SIGNIFICANT CHANGE UP (ref 32–36)
MCV RBC AUTO: 90.4 FL — SIGNIFICANT CHANGE UP (ref 80–100)
MCV RBC AUTO: 91.6 FL — SIGNIFICANT CHANGE UP (ref 80–100)
MONOCYTES # BLD AUTO: 0.64 K/UL — SIGNIFICANT CHANGE UP (ref 0–0.9)
MONOCYTES # BLD AUTO: 0.7 K/UL — SIGNIFICANT CHANGE UP (ref 0–0.9)
MONOCYTES NFR BLD AUTO: 6 % — SIGNIFICANT CHANGE UP (ref 2–14)
MONOCYTES NFR BLD AUTO: 6.2 % — SIGNIFICANT CHANGE UP (ref 2–14)
NEUTROPHILS # BLD AUTO: 7.16 K/UL — SIGNIFICANT CHANGE UP (ref 1.8–7.4)
NEUTROPHILS # BLD AUTO: 7.2 K/UL — SIGNIFICANT CHANGE UP (ref 1.8–7.4)
NEUTROPHILS NFR BLD AUTO: 66.9 % — SIGNIFICANT CHANGE UP (ref 43–77)
NEUTROPHILS NFR BLD AUTO: 67.6 % — SIGNIFICANT CHANGE UP (ref 43–77)
NITRITE UR-MCNC: NEGATIVE — SIGNIFICANT CHANGE UP
PH UR: 7 — SIGNIFICANT CHANGE UP (ref 5–8)
PLATELET # BLD AUTO: 254 K/UL — SIGNIFICANT CHANGE UP (ref 150–400)
PLATELET # BLD AUTO: 268 K/UL — SIGNIFICANT CHANGE UP (ref 150–400)
POTASSIUM SERPL-MCNC: 3.6 MMOL/L — SIGNIFICANT CHANGE UP (ref 3.5–5.3)
POTASSIUM SERPL-SCNC: 3.6 MMOL/L — SIGNIFICANT CHANGE UP (ref 3.5–5.3)
PROT SERPL-MCNC: 7.1 G/DL — SIGNIFICANT CHANGE UP (ref 6–8.3)
PROT UR-MCNC: NEGATIVE — SIGNIFICANT CHANGE UP
RBC # BLD: 3.39 M/UL — LOW (ref 3.8–5.2)
RBC # BLD: 3.46 M/UL — LOW (ref 3.8–5.2)
RBC # FLD: 12.4 % — SIGNIFICANT CHANGE UP (ref 10.3–14.5)
RBC # FLD: 14.9 % — HIGH (ref 10.3–14.5)
RH IG SCN BLD-IMP: POSITIVE — SIGNIFICANT CHANGE UP
SODIUM SERPL-SCNC: 139 MMOL/L — SIGNIFICANT CHANGE UP (ref 135–145)
SP GR SPEC: <1.005 — LOW (ref 1.01–1.02)
T4 FREE SERPL-MCNC: 1.3 NG/DL — SIGNIFICANT CHANGE UP (ref 0.9–1.8)
TSH SERPL-MCNC: 3.68 UIU/ML — SIGNIFICANT CHANGE UP (ref 0.27–4.2)
UROBILINOGEN FLD QL: NEGATIVE — SIGNIFICANT CHANGE UP
WBC # BLD: 10.59 K/UL — HIGH (ref 3.8–10.5)
WBC # BLD: 10.8 K/UL — HIGH (ref 3.8–10.5)
WBC # FLD AUTO: 10.59 K/UL — HIGH (ref 3.8–10.5)
WBC # FLD AUTO: 10.8 K/UL — HIGH (ref 3.8–10.5)
WBC UR QL: SIGNIFICANT CHANGE UP /HPF (ref 0–5)

## 2018-05-31 PROCEDURE — 99223 1ST HOSP IP/OBS HIGH 75: CPT

## 2018-05-31 PROCEDURE — 76817 TRANSVAGINAL US OBSTETRIC: CPT

## 2018-05-31 PROCEDURE — 76816 OB US FOLLOW-UP PER FETUS: CPT

## 2018-05-31 RX ORDER — NIFEDIPINE 30 MG
10 TABLET, EXTENDED RELEASE 24 HR ORAL EVERY 6 HOURS
Qty: 0 | Refills: 0 | Status: DISCONTINUED | OUTPATIENT
Start: 2018-05-31 | End: 2018-06-01

## 2018-05-31 RX ORDER — DOCUSATE SODIUM 100 MG
100 CAPSULE ORAL THREE TIMES A DAY
Qty: 0 | Refills: 0 | Status: DISCONTINUED | OUTPATIENT
Start: 2018-05-31 | End: 2018-06-02

## 2018-05-31 RX ORDER — HEPARIN SODIUM 5000 [USP'U]/ML
5000 INJECTION INTRAVENOUS; SUBCUTANEOUS EVERY 12 HOURS
Qty: 0 | Refills: 0 | Status: DISCONTINUED | OUTPATIENT
Start: 2018-05-31 | End: 2018-06-02

## 2018-05-31 RX ORDER — LEVOTHYROXINE SODIUM 125 MCG
50 TABLET ORAL DAILY
Qty: 0 | Refills: 0 | Status: DISCONTINUED | OUTPATIENT
Start: 2018-05-31 | End: 2018-06-02

## 2018-05-31 RX ORDER — FOLIC ACID 0.8 MG
1 TABLET ORAL DAILY
Qty: 0 | Refills: 0 | Status: DISCONTINUED | OUTPATIENT
Start: 2018-05-31 | End: 2018-06-02

## 2018-05-31 RX ADMIN — Medication 10 MILLIGRAM(S): at 14:47

## 2018-05-31 RX ADMIN — Medication 10 MILLIGRAM(S): at 21:00

## 2018-05-31 RX ADMIN — Medication 12 MILLIGRAM(S): at 14:47

## 2018-06-01 ENCOUNTER — TRANSCRIPTION ENCOUNTER (OUTPATIENT)
Age: 28
End: 2018-06-01

## 2018-06-01 DIAGNOSIS — O30.042 TWIN PREGNANCY, DICHORIONIC/DIAMNIOTIC, SECOND TRIMESTER: ICD-10-CM

## 2018-06-01 DIAGNOSIS — Z34.92 ENCOUNTER FOR SUPERVISION OF NORMAL PREGNANCY, UNSPECIFIED, SECOND TRIMESTER: ICD-10-CM

## 2018-06-01 DIAGNOSIS — E03.9 HYPOTHYROIDISM, UNSPECIFIED: ICD-10-CM

## 2018-06-01 LAB
GROUP B BETA STREP DNA (PCR): DETECTED
GROUP B BETA STREP INTERPRETATION: SIGNIFICANT CHANGE UP
SOURCE GROUP B STREP: SIGNIFICANT CHANGE UP
T PALLIDUM AB TITR SER: NEGATIVE — SIGNIFICANT CHANGE UP

## 2018-06-01 PROCEDURE — 99232 SBSQ HOSP IP/OBS MODERATE 35: CPT

## 2018-06-01 PROCEDURE — 71046 X-RAY EXAM CHEST 2 VIEWS: CPT | Mod: 26

## 2018-06-01 RX ORDER — LEVOTHYROXINE SODIUM 125 MCG
1 TABLET ORAL
Qty: 0 | Refills: 0 | COMMUNITY
Start: 2018-06-01

## 2018-06-01 RX ORDER — PROGESTERONE 200 MG/1
200 CAPSULE, LIQUID FILLED ORAL
Qty: 60 | Refills: 0 | OUTPATIENT
Start: 2018-06-01

## 2018-06-01 RX ORDER — PROGESTERONE 200 MG/1
200 CAPSULE, LIQUID FILLED ORAL AT BEDTIME
Qty: 0 | Refills: 0 | Status: DISCONTINUED | OUTPATIENT
Start: 2018-06-01 | End: 2018-06-02

## 2018-06-01 RX ADMIN — Medication 100 MILLIGRAM(S): at 23:53

## 2018-06-01 RX ADMIN — HEPARIN SODIUM 5000 UNIT(S): 5000 INJECTION INTRAVENOUS; SUBCUTANEOUS at 05:58

## 2018-06-01 RX ADMIN — PROGESTERONE 200 MILLIGRAM(S): 200 CAPSULE, LIQUID FILLED ORAL at 23:53

## 2018-06-01 RX ADMIN — Medication 1 TABLET(S): at 11:55

## 2018-06-01 RX ADMIN — HEPARIN SODIUM 5000 UNIT(S): 5000 INJECTION INTRAVENOUS; SUBCUTANEOUS at 18:19

## 2018-06-01 RX ADMIN — Medication 50 MICROGRAM(S): at 05:58

## 2018-06-01 RX ADMIN — Medication 10 MILLIGRAM(S): at 04:24

## 2018-06-01 RX ADMIN — Medication 100 MILLIGRAM(S): at 06:00

## 2018-06-01 RX ADMIN — Medication 10 MILLIGRAM(S): at 09:57

## 2018-06-01 RX ADMIN — Medication 12 MILLIGRAM(S): at 14:45

## 2018-06-01 RX ADMIN — Medication 1 MILLIGRAM(S): at 11:55

## 2018-06-01 RX ADMIN — Medication 100 MILLIGRAM(S): at 14:45

## 2018-06-01 NOTE — DISCHARGE NOTE ANTEPARTUM - INSTRUCTIONS
Follow up with MD as directed call if any vaginal bleeding, leakage of fluid, and cramping, decreased fetal movement. Follow up with MD as directed.

## 2018-06-01 NOTE — DISCHARGE NOTE ANTEPARTUM - PATIENT PORTAL LINK FT
You can access the [facility] Patient Portal, offered by [facility], by registering with the following website: [email or web address]

## 2018-06-01 NOTE — DISCHARGE NOTE ANTEPARTUM - PLAN OF CARE
prolong pregnancy Continue to take the vaginal progesterone 200mg - pill nightly placed inside the vagina. You received betamethasone on 5/31-6/1 for fetal lung maturity. Follow up with your OB at [facility] next week. A CXR was done and was negative for TB granulomas. Continue your routine aspirin and synthroid.

## 2018-06-01 NOTE — DISCHARGE NOTE ANTEPARTUM - HOSPITAL COURSE
28yoF  at 25w0d admitted with di-di TIUP and new cervical shortening found on ultrasound. Treated for  contractions with betamethasone and procardia. She was closed on vaginal exam. Contractions resolved and patient remained stable without complaints. CXR done for h/o +quant gold and showed clear lungs. She was started on vaginal progesterone. She was discharged in stable condition for outpatient follow up with  clinic. 28yoF  at 25w0d admitted with di-di TIUP and new cervical shortening found on ultrasound. Treated for  contractions with betamethasone and procardia. She was closed on vaginal exam. Contractions resolved and patient remained stable without complaints. CXR done for h/o +quant gold and showed clear lungs. She was started on vaginal progesterone. Asymptomatic tachycardia was noted. EKG was NSR. She was discharged in stable condition for outpatient follow up with  clinic. 28yoF  at 25w0d admitted with di-di TIUP and new cervical shortening found on ultrasound. Treated for  contractions with betamethasone and procardia. She was closed on vaginal exam. Contractions resolved and patient remained stable without complaints. CXR done for h/o +quant gold and showed clear lungs. She was started on vaginal progesterone. Asymptomatic tachycardia was noted. EKG which read a HR of 114 w/ atrial flutter. EKG was reviewed with Cardiology fellow and the findings were consistent with normal sinus rhythm and likely lead V misplacement. At the time of discharge pt was hemodynamically stable. She was discharged in stable condition for outpatient follow up with  clinic.

## 2018-06-01 NOTE — DISCHARGE NOTE ANTEPARTUM - MEDICATION SUMMARY - MEDICATIONS TO TAKE
I will START or STAY ON the medications listed below when I get home from the hospital:    Prenatal Multivitamins with Folic Acid 1 mg oral tablet  -- 1 tab(s) by mouth once a day  -- Indication: For home med    progesterone 100 mg vaginal insert  -- 200 milligram(s) vaginally once a day (at bedtime)   -- Indication: For cervical shortening    levothyroxine 50 mcg (0.05 mg) oral tablet  -- 1 tab(s) by mouth once a day  -- Indication: For hypothyroid I will START or STAY ON the medications listed below when I get home from the hospital:    Prenatal Multivitamins with Folic Acid 1 mg oral tablet  -- 1 tab(s) by mouth once a day  -- Indication: For home med    progesterone 100 mg vaginal insert  -- 200 milligram(s) vaginally once a day (at bedtime)   -- Indication: For Cervical shortening, antepartum condition or complication    levothyroxine 50 mcg (0.05 mg) oral tablet  -- 1 tab(s) by mouth once a day  -- Indication: For hypothyroid

## 2018-06-01 NOTE — DISCHARGE NOTE ANTEPARTUM - CARE PLAN
Principal Discharge DX:	Cervical shortening, antepartum condition or complication  Goal:	prolong pregnancy  Assessment and plan of treatment:	Continue to take the vaginal progesterone 200mg - pill nightly placed inside the vagina. You received betamethasone on 5/31-6/1 for fetal lung maturity. Follow up with your OB at [facility] next week. A CXR was done and was negative for TB granulomas. Continue your routine aspirin and synthroid.

## 2018-06-02 VITALS
DIASTOLIC BLOOD PRESSURE: 75 MMHG | HEART RATE: 105 BPM | TEMPERATURE: 99 F | OXYGEN SATURATION: 100 % | SYSTOLIC BLOOD PRESSURE: 112 MMHG | RESPIRATION RATE: 18 BRPM

## 2018-06-02 PROCEDURE — 86780 TREPONEMA PALLIDUM: CPT

## 2018-06-02 PROCEDURE — 71046 X-RAY EXAM CHEST 2 VIEWS: CPT

## 2018-06-02 PROCEDURE — 86901 BLOOD TYPING SEROLOGIC RH(D): CPT

## 2018-06-02 PROCEDURE — 93005 ELECTROCARDIOGRAM TRACING: CPT

## 2018-06-02 PROCEDURE — 86850 RBC ANTIBODY SCREEN: CPT

## 2018-06-02 PROCEDURE — 93010 ELECTROCARDIOGRAM REPORT: CPT

## 2018-06-02 PROCEDURE — 80053 COMPREHEN METABOLIC PANEL: CPT

## 2018-06-02 PROCEDURE — 87653 STREP B DNA AMP PROBE: CPT

## 2018-06-02 PROCEDURE — 99238 HOSP IP/OBS DSCHRG MGMT 30/<: CPT

## 2018-06-02 PROCEDURE — 81001 URINALYSIS AUTO W/SCOPE: CPT

## 2018-06-02 PROCEDURE — G0463: CPT

## 2018-06-02 PROCEDURE — 59025 FETAL NON-STRESS TEST: CPT

## 2018-06-02 PROCEDURE — 85027 COMPLETE CBC AUTOMATED: CPT

## 2018-06-02 PROCEDURE — 86900 BLOOD TYPING SEROLOGIC ABO: CPT

## 2018-06-02 RX ADMIN — Medication 100 MILLIGRAM(S): at 06:05

## 2018-06-02 RX ADMIN — Medication 1 TABLET(S): at 12:34

## 2018-06-02 RX ADMIN — HEPARIN SODIUM 5000 UNIT(S): 5000 INJECTION INTRAVENOUS; SUBCUTANEOUS at 06:04

## 2018-06-02 RX ADMIN — Medication 50 MICROGRAM(S): at 06:04

## 2018-06-02 RX ADMIN — Medication 1 MILLIGRAM(S): at 12:34

## 2018-06-04 ENCOUNTER — MED ADMIN CHARGE (OUTPATIENT)
Age: 28
End: 2018-06-04

## 2018-06-05 RX ORDER — PROGESTERONE 200 MG/1
200 CAPSULE, LIQUID FILLED ORAL
Qty: 60 | Refills: 0 | OUTPATIENT
Start: 2018-06-05

## 2018-06-07 ENCOUNTER — ASOB RESULT (OUTPATIENT)
Age: 28
End: 2018-06-07

## 2018-06-07 ENCOUNTER — APPOINTMENT (OUTPATIENT)
Dept: ANTEPARTUM | Facility: CLINIC | Age: 28
End: 2018-06-07
Payer: MEDICAID

## 2018-06-07 PROCEDURE — 76815 OB US LIMITED FETUS(S): CPT

## 2018-06-07 PROCEDURE — 76817 TRANSVAGINAL US OBSTETRIC: CPT

## 2018-06-20 ENCOUNTER — APPOINTMENT (OUTPATIENT)
Dept: OBGYN | Facility: CLINIC | Age: 28
End: 2018-06-20
Payer: MEDICAID

## 2018-06-20 ENCOUNTER — OUTPATIENT (OUTPATIENT)
Dept: OUTPATIENT SERVICES | Facility: HOSPITAL | Age: 28
LOS: 1 days | End: 2018-06-20
Payer: MEDICAID

## 2018-06-20 ENCOUNTER — NON-APPOINTMENT (OUTPATIENT)
Age: 28
End: 2018-06-20

## 2018-06-20 VITALS
SYSTOLIC BLOOD PRESSURE: 117 MMHG | DIASTOLIC BLOOD PRESSURE: 80 MMHG | BODY MASS INDEX: 25.61 KG/M2 | HEIGHT: 64 IN | WEIGHT: 150 LBS

## 2018-06-20 DIAGNOSIS — R76.12 NONSPECIFIC REACTION TO CELL MEDIATED IMMUNITY MEASUREMENT OF GAMMA INTERFERON ANTIGEN RESPONSE W/OUT ACTIVE TUBERCULOSIS: ICD-10-CM

## 2018-06-20 DIAGNOSIS — O30.043 TWIN PREGNANCY, DICHORIONIC/DIAMNIOTIC, THIRD TRIMESTER: ICD-10-CM

## 2018-06-20 DIAGNOSIS — Z34.00 ENCOUNTER FOR SUPERVISION OF NORMAL FIRST PREGNANCY, UNSPECIFIED TRIMESTER: ICD-10-CM

## 2018-06-20 DIAGNOSIS — Z34.92 ENCOUNTER FOR SUPERVISION OF NORMAL PREGNANCY, UNSPECIFIED, SECOND TRIMESTER: ICD-10-CM

## 2018-06-20 DIAGNOSIS — Z98.890 OTHER SPECIFIED POSTPROCEDURAL STATES: Chronic | ICD-10-CM

## 2018-06-20 PROCEDURE — 99213 OFFICE O/P EST LOW 20 MIN: CPT

## 2018-06-20 PROCEDURE — G0463: CPT

## 2018-06-20 PROCEDURE — 81003 URINALYSIS AUTO W/O SCOPE: CPT

## 2018-06-20 PROCEDURE — 84156 ASSAY OF PROTEIN URINE: CPT

## 2018-06-21 DIAGNOSIS — O30.043 TWIN PREGNANCY, DICHORIONIC/DIAMNIOTIC, THIRD TRIMESTER: ICD-10-CM

## 2018-06-21 DIAGNOSIS — O30.042 TWIN PREGNANCY, DICHORIONIC/DIAMNIOTIC, SECOND TRIMESTER: ICD-10-CM

## 2018-06-21 DIAGNOSIS — O34.30 MATERNAL CARE FOR CERVICAL INCOMPETENCE, UNSPECIFIED TRIMESTER: ICD-10-CM

## 2018-06-21 DIAGNOSIS — E03.9 HYPOTHYROIDISM, UNSPECIFIED: ICD-10-CM

## 2018-06-21 LAB
CREAT ?TM UR-MCNC: 27 MG/DL — SIGNIFICANT CHANGE UP
PROT ?TM UR-MCNC: <4 MG/DL — SIGNIFICANT CHANGE UP (ref 0–12)
PROT/CREAT UR-RTO: SIGNIFICANT CHANGE UP (ref 0–0.2)

## 2018-06-25 ENCOUNTER — ASOB RESULT (OUTPATIENT)
Age: 28
End: 2018-06-25

## 2018-06-25 ENCOUNTER — APPOINTMENT (OUTPATIENT)
Dept: ANTEPARTUM | Facility: CLINIC | Age: 28
End: 2018-06-25
Payer: MEDICAID

## 2018-06-25 PROCEDURE — 76816 OB US FOLLOW-UP PER FETUS: CPT | Mod: 59

## 2018-07-05 ENCOUNTER — APPOINTMENT (OUTPATIENT)
Dept: OBGYN | Facility: CLINIC | Age: 28
End: 2018-07-05
Payer: MEDICAID

## 2018-07-05 ENCOUNTER — NON-APPOINTMENT (OUTPATIENT)
Age: 28
End: 2018-07-05

## 2018-07-05 ENCOUNTER — OUTPATIENT (OUTPATIENT)
Dept: OUTPATIENT SERVICES | Facility: HOSPITAL | Age: 28
LOS: 1 days | End: 2018-07-05
Payer: MEDICAID

## 2018-07-05 VITALS
WEIGHT: 154 LBS | SYSTOLIC BLOOD PRESSURE: 113 MMHG | HEIGHT: 64 IN | DIASTOLIC BLOOD PRESSURE: 76 MMHG | BODY MASS INDEX: 26.29 KG/M2

## 2018-07-05 DIAGNOSIS — O30.042 TWIN PREGNANCY, DICHORIONIC/DIAMNIOTIC, SECOND TRIMESTER: ICD-10-CM

## 2018-07-05 DIAGNOSIS — O34.30 MATERNAL CARE FOR CERVICAL INCOMPETENCE, UNSPECIFIED TRIMESTER: ICD-10-CM

## 2018-07-05 DIAGNOSIS — Z34.00 ENCOUNTER FOR SUPERVISION OF NORMAL FIRST PREGNANCY, UNSPECIFIED TRIMESTER: ICD-10-CM

## 2018-07-05 DIAGNOSIS — Z98.890 OTHER SPECIFIED POSTPROCEDURAL STATES: Chronic | ICD-10-CM

## 2018-07-05 PROCEDURE — G0463: CPT

## 2018-07-05 PROCEDURE — 99213 OFFICE O/P EST LOW 20 MIN: CPT | Mod: NC

## 2018-07-05 PROCEDURE — 81003 URINALYSIS AUTO W/O SCOPE: CPT

## 2018-07-05 PROCEDURE — 81003 URINALYSIS AUTO W/O SCOPE: CPT | Mod: NC,QW

## 2018-07-06 DIAGNOSIS — E03.9 HYPOTHYROIDISM, UNSPECIFIED: ICD-10-CM

## 2018-07-06 DIAGNOSIS — Z34.93 ENCOUNTER FOR SUPERVISION OF NORMAL PREGNANCY, UNSPECIFIED, THIRD TRIMESTER: ICD-10-CM

## 2018-07-06 DIAGNOSIS — O30.043 TWIN PREGNANCY, DICHORIONIC/DIAMNIOTIC, THIRD TRIMESTER: ICD-10-CM

## 2018-07-19 ENCOUNTER — APPOINTMENT (OUTPATIENT)
Dept: ANTEPARTUM | Facility: CLINIC | Age: 28
End: 2018-07-19
Payer: MEDICAID

## 2018-07-19 ENCOUNTER — ASOB RESULT (OUTPATIENT)
Age: 28
End: 2018-07-19

## 2018-07-19 ENCOUNTER — OUTPATIENT (OUTPATIENT)
Dept: OUTPATIENT SERVICES | Facility: HOSPITAL | Age: 28
LOS: 1 days | End: 2018-07-19
Payer: MEDICAID

## 2018-07-19 ENCOUNTER — NON-APPOINTMENT (OUTPATIENT)
Age: 28
End: 2018-07-19

## 2018-07-19 ENCOUNTER — APPOINTMENT (OUTPATIENT)
Dept: OBGYN | Facility: CLINIC | Age: 28
End: 2018-07-19
Payer: MEDICAID

## 2018-07-19 VITALS
SYSTOLIC BLOOD PRESSURE: 106 MMHG | DIASTOLIC BLOOD PRESSURE: 72 MMHG | BODY MASS INDEX: 26.12 KG/M2 | WEIGHT: 153 LBS | HEIGHT: 64 IN

## 2018-07-19 DIAGNOSIS — Z98.890 OTHER SPECIFIED POSTPROCEDURAL STATES: Chronic | ICD-10-CM

## 2018-07-19 DIAGNOSIS — Z34.00 ENCOUNTER FOR SUPERVISION OF NORMAL FIRST PREGNANCY, UNSPECIFIED TRIMESTER: ICD-10-CM

## 2018-07-19 PROCEDURE — 99213 OFFICE O/P EST LOW 20 MIN: CPT

## 2018-07-19 PROCEDURE — 84443 ASSAY THYROID STIM HORMONE: CPT

## 2018-07-19 PROCEDURE — 76816 OB US FOLLOW-UP PER FETUS: CPT | Mod: 59

## 2018-07-19 PROCEDURE — 81003 URINALYSIS AUTO W/O SCOPE: CPT

## 2018-07-19 PROCEDURE — G0463: CPT

## 2018-07-19 PROCEDURE — 85027 COMPLETE CBC AUTOMATED: CPT

## 2018-07-19 PROCEDURE — 84439 ASSAY OF FREE THYROXINE: CPT

## 2018-07-20 DIAGNOSIS — O30.043 TWIN PREGNANCY, DICHORIONIC/DIAMNIOTIC, THIRD TRIMESTER: ICD-10-CM

## 2018-07-20 DIAGNOSIS — E03.9 HYPOTHYROIDISM, UNSPECIFIED: ICD-10-CM

## 2018-07-20 LAB
BASOPHILS # BLD AUTO: 0.18 K/UL — SIGNIFICANT CHANGE UP (ref 0–0.2)
BASOPHILS NFR BLD AUTO: 1.8 % — SIGNIFICANT CHANGE UP (ref 0–2)
EOSINOPHIL # BLD AUTO: 0.18 K/UL — SIGNIFICANT CHANGE UP (ref 0–0.5)
EOSINOPHIL NFR BLD AUTO: 1.8 % — SIGNIFICANT CHANGE UP (ref 0–6)
GIANT PLATELETS BLD QL SMEAR: PRESENT — SIGNIFICANT CHANGE UP
HCT VFR BLD CALC: 32.8 % — LOW (ref 34.5–45)
HGB BLD-MCNC: 10.9 G/DL — LOW (ref 11.5–15.5)
LYMPHOCYTES # BLD AUTO: 2.48 K/UL — SIGNIFICANT CHANGE UP (ref 1–3.3)
LYMPHOCYTES # BLD AUTO: 24.5 % — SIGNIFICANT CHANGE UP (ref 13–44)
MANUAL SMEAR VERIFICATION: SIGNIFICANT CHANGE UP
MCHC RBC-ENTMCNC: 30.7 PG — SIGNIFICANT CHANGE UP (ref 27–34)
MCHC RBC-ENTMCNC: 33.2 GM/DL — SIGNIFICANT CHANGE UP (ref 32–36)
MCV RBC AUTO: 92.4 FL — SIGNIFICANT CHANGE UP (ref 80–100)
MONOCYTES # BLD AUTO: 0.74 K/UL — SIGNIFICANT CHANGE UP (ref 0–0.9)
MONOCYTES NFR BLD AUTO: 7.3 % — SIGNIFICANT CHANGE UP (ref 2–14)
NEUTROPHILS # BLD AUTO: 6.53 K/UL — SIGNIFICANT CHANGE UP (ref 1.8–7.4)
NEUTROPHILS NFR BLD AUTO: 64.6 % — SIGNIFICANT CHANGE UP (ref 43–77)
PLAT MORPH BLD: NORMAL — SIGNIFICANT CHANGE UP
PLATELET # BLD AUTO: 231 K/UL — SIGNIFICANT CHANGE UP (ref 150–400)
RBC # BLD: 3.55 M/UL — LOW (ref 3.8–5.2)
RBC # FLD: 14.6 % — HIGH (ref 10.3–14.5)
RBC BLD AUTO: NORMAL — SIGNIFICANT CHANGE UP
SMUDGE CELLS # BLD: PRESENT — SIGNIFICANT CHANGE UP
T4 FREE SERPL-MCNC: 1.3 NG/DL — SIGNIFICANT CHANGE UP (ref 0.9–1.8)
TSH SERPL-MCNC: 4.26 UIU/ML — HIGH (ref 0.27–4.2)
WBC # BLD: 10.11 K/UL — SIGNIFICANT CHANGE UP (ref 3.8–10.5)
WBC # FLD AUTO: 10.11 K/UL — SIGNIFICANT CHANGE UP (ref 3.8–10.5)

## 2018-08-02 ENCOUNTER — APPOINTMENT (OUTPATIENT)
Dept: ANTEPARTUM | Facility: CLINIC | Age: 28
End: 2018-08-02
Payer: MEDICAID

## 2018-08-02 ENCOUNTER — ASOB RESULT (OUTPATIENT)
Age: 28
End: 2018-08-02

## 2018-08-02 ENCOUNTER — APPOINTMENT (OUTPATIENT)
Dept: OBGYN | Facility: CLINIC | Age: 28
End: 2018-08-02
Payer: MEDICAID

## 2018-08-02 ENCOUNTER — NON-APPOINTMENT (OUTPATIENT)
Age: 28
End: 2018-08-02

## 2018-08-02 ENCOUNTER — OUTPATIENT (OUTPATIENT)
Dept: OUTPATIENT SERVICES | Facility: HOSPITAL | Age: 28
LOS: 1 days | End: 2018-08-02
Payer: MEDICAID

## 2018-08-02 VITALS
SYSTOLIC BLOOD PRESSURE: 109 MMHG | WEIGHT: 154 LBS | HEIGHT: 64 IN | BODY MASS INDEX: 26.29 KG/M2 | DIASTOLIC BLOOD PRESSURE: 74 MMHG

## 2018-08-02 DIAGNOSIS — Z98.890 OTHER SPECIFIED POSTPROCEDURAL STATES: Chronic | ICD-10-CM

## 2018-08-02 DIAGNOSIS — Z34.00 ENCOUNTER FOR SUPERVISION OF NORMAL FIRST PREGNANCY, UNSPECIFIED TRIMESTER: ICD-10-CM

## 2018-08-02 PROCEDURE — 76816 OB US FOLLOW-UP PER FETUS: CPT | Mod: 59

## 2018-08-02 PROCEDURE — 82239 BILE ACIDS TOTAL: CPT

## 2018-08-02 PROCEDURE — 99213 OFFICE O/P EST LOW 20 MIN: CPT

## 2018-08-02 PROCEDURE — 81003 URINALYSIS AUTO W/O SCOPE: CPT

## 2018-08-02 PROCEDURE — G0463: CPT

## 2018-08-02 PROCEDURE — 76818 FETAL BIOPHYS PROFILE W/NST: CPT

## 2018-08-03 DIAGNOSIS — O30.043 TWIN PREGNANCY, DICHORIONIC/DIAMNIOTIC, THIRD TRIMESTER: ICD-10-CM

## 2018-08-03 DIAGNOSIS — Z34.93 ENCOUNTER FOR SUPERVISION OF NORMAL PREGNANCY, UNSPECIFIED, THIRD TRIMESTER: ICD-10-CM

## 2018-08-04 LAB — BILE AC FLD-MCNC: 10.5 UMOL/L — SIGNIFICANT CHANGE UP (ref 4.7–24.5)

## 2018-08-09 ENCOUNTER — INPATIENT (INPATIENT)
Facility: HOSPITAL | Age: 28
LOS: 3 days | Discharge: HOME | End: 2018-08-13
Attending: OBSTETRICS & GYNECOLOGY | Admitting: OBSTETRICS & GYNECOLOGY
Payer: MEDICAID

## 2018-08-09 ENCOUNTER — NON-APPOINTMENT (OUTPATIENT)
Age: 28
End: 2018-08-09

## 2018-08-09 ENCOUNTER — APPOINTMENT (OUTPATIENT)
Dept: OBGYN | Facility: CLINIC | Age: 28
End: 2018-08-09
Payer: MEDICAID

## 2018-08-09 ENCOUNTER — TRANSCRIPTION ENCOUNTER (OUTPATIENT)
Age: 28
End: 2018-08-09

## 2018-08-09 ENCOUNTER — OUTPATIENT (OUTPATIENT)
Dept: OUTPATIENT SERVICES | Facility: HOSPITAL | Age: 28
LOS: 1 days | End: 2018-08-09
Payer: MEDICAID

## 2018-08-09 VITALS
BODY MASS INDEX: 26.46 KG/M2 | SYSTOLIC BLOOD PRESSURE: 115 MMHG | DIASTOLIC BLOOD PRESSURE: 78 MMHG | WEIGHT: 155 LBS | HEIGHT: 64 IN

## 2018-08-09 VITALS — WEIGHT: 154.32 LBS | HEIGHT: 64 IN

## 2018-08-09 DIAGNOSIS — O26.899 OTHER SPECIFIED PREGNANCY RELATED CONDITIONS, UNSPECIFIED TRIMESTER: ICD-10-CM

## 2018-08-09 DIAGNOSIS — Z98.890 OTHER SPECIFIED POSTPROCEDURAL STATES: Chronic | ICD-10-CM

## 2018-08-09 DIAGNOSIS — Z3A.00 WEEKS OF GESTATION OF PREGNANCY NOT SPECIFIED: ICD-10-CM

## 2018-08-09 DIAGNOSIS — Z34.80 ENCOUNTER FOR SUPERVISION OF OTHER NORMAL PREGNANCY, UNSPECIFIED TRIMESTER: ICD-10-CM

## 2018-08-09 DIAGNOSIS — Z34.00 ENCOUNTER FOR SUPERVISION OF NORMAL FIRST PREGNANCY, UNSPECIFIED TRIMESTER: ICD-10-CM

## 2018-08-09 LAB
APTT BLD: 26.2 SEC — LOW (ref 27.5–37.4)
BASOPHILS # BLD AUTO: 0.02 K/UL — SIGNIFICANT CHANGE UP (ref 0–0.2)
BASOPHILS # BLD AUTO: 0.1 K/UL — SIGNIFICANT CHANGE UP (ref 0–0.2)
BASOPHILS NFR BLD AUTO: 0.2 % — SIGNIFICANT CHANGE UP (ref 0–2)
BASOPHILS NFR BLD AUTO: 0.9 % — SIGNIFICANT CHANGE UP (ref 0–2)
EOSINOPHIL # BLD AUTO: 0.38 K/UL — SIGNIFICANT CHANGE UP (ref 0–0.5)
EOSINOPHIL # BLD AUTO: 0.4 K/UL — SIGNIFICANT CHANGE UP (ref 0–0.5)
EOSINOPHIL NFR BLD AUTO: 3.5 % — SIGNIFICANT CHANGE UP (ref 0–6)
EOSINOPHIL NFR BLD AUTO: 3.5 % — SIGNIFICANT CHANGE UP (ref 0–6)
FIBRINOGEN PPP-MCNC: 834 MG/DL — HIGH (ref 310–510)
HCT VFR BLD CALC: 35.3 % — SIGNIFICANT CHANGE UP (ref 34.5–45)
HCT VFR BLD CALC: 36.4 % — SIGNIFICANT CHANGE UP (ref 34.5–45)
HGB BLD-MCNC: 11.7 G/DL — SIGNIFICANT CHANGE UP (ref 11.5–15.5)
HGB BLD-MCNC: 12.3 G/DL — SIGNIFICANT CHANGE UP (ref 11.5–15.5)
HIV 1 & 2 AB SERPL IA.RAPID: SIGNIFICANT CHANGE UP
IMM GRANULOCYTES NFR BLD AUTO: 0.6 % — SIGNIFICANT CHANGE UP (ref 0–1.5)
INR BLD: 0.98 RATIO — SIGNIFICANT CHANGE UP (ref 0.88–1.16)
LYMPHOCYTES # BLD AUTO: 2.88 K/UL — SIGNIFICANT CHANGE UP (ref 1–3.3)
LYMPHOCYTES # BLD AUTO: 26.6 % — SIGNIFICANT CHANGE UP (ref 13–44)
LYMPHOCYTES # BLD AUTO: 28.4 % — SIGNIFICANT CHANGE UP (ref 13–44)
LYMPHOCYTES # BLD AUTO: 3 K/UL — SIGNIFICANT CHANGE UP (ref 1–3.3)
MCHC RBC-ENTMCNC: 29.7 PG — SIGNIFICANT CHANGE UP (ref 27–34)
MCHC RBC-ENTMCNC: 30.2 PG — SIGNIFICANT CHANGE UP (ref 27–34)
MCHC RBC-ENTMCNC: 33.1 GM/DL — SIGNIFICANT CHANGE UP (ref 32–36)
MCHC RBC-ENTMCNC: 33.8 GM/DL — SIGNIFICANT CHANGE UP (ref 32–36)
MCV RBC AUTO: 89.6 FL — SIGNIFICANT CHANGE UP (ref 80–100)
MCV RBC AUTO: 89.6 FL — SIGNIFICANT CHANGE UP (ref 80–100)
MONOCYTES # BLD AUTO: 0.5 K/UL — SIGNIFICANT CHANGE UP (ref 0–0.9)
MONOCYTES # BLD AUTO: 0.72 K/UL — SIGNIFICANT CHANGE UP (ref 0–0.9)
MONOCYTES NFR BLD AUTO: 4.6 % — SIGNIFICANT CHANGE UP (ref 2–14)
MONOCYTES NFR BLD AUTO: 6.6 % — SIGNIFICANT CHANGE UP (ref 2–14)
NEUTROPHILS # BLD AUTO: 6.7 K/UL — SIGNIFICANT CHANGE UP (ref 1.8–7.4)
NEUTROPHILS # BLD AUTO: 6.76 K/UL — SIGNIFICANT CHANGE UP (ref 1.8–7.4)
NEUTROPHILS NFR BLD AUTO: 62.5 % — SIGNIFICANT CHANGE UP (ref 43–77)
NEUTROPHILS NFR BLD AUTO: 62.6 % — SIGNIFICANT CHANGE UP (ref 43–77)
PLATELET # BLD AUTO: 215 K/UL — SIGNIFICANT CHANGE UP (ref 150–400)
PLATELET # BLD AUTO: 242 K/UL — SIGNIFICANT CHANGE UP (ref 150–400)
PROTHROM AB SERPL-ACNC: 10.7 SEC — SIGNIFICANT CHANGE UP (ref 9.8–12.7)
RBC # BLD: 3.94 M/UL — SIGNIFICANT CHANGE UP (ref 3.8–5.2)
RBC # BLD: 4.07 M/UL — SIGNIFICANT CHANGE UP (ref 3.8–5.2)
RBC # FLD: 12 % — SIGNIFICANT CHANGE UP (ref 10.3–14.5)
RBC # FLD: 13.7 % — SIGNIFICANT CHANGE UP (ref 10.3–14.5)
WBC # BLD: 10.7 K/UL — HIGH (ref 3.8–10.5)
WBC # BLD: 10.83 K/UL — HIGH (ref 3.8–10.5)
WBC # FLD AUTO: 10.7 K/UL — HIGH (ref 3.8–10.5)
WBC # FLD AUTO: 10.83 K/UL — HIGH (ref 3.8–10.5)

## 2018-08-09 PROCEDURE — 87389 HIV-1 AG W/HIV-1&-2 AB AG IA: CPT

## 2018-08-09 PROCEDURE — 99222 1ST HOSP IP/OBS MODERATE 55: CPT

## 2018-08-09 PROCEDURE — 87653 STREP B DNA AMP PROBE: CPT

## 2018-08-09 PROCEDURE — G0463: CPT

## 2018-08-09 PROCEDURE — 99213 OFFICE O/P EST LOW 20 MIN: CPT | Mod: NC

## 2018-08-09 PROCEDURE — 81099 UNLISTED URINALYSIS PX: CPT | Mod: NC

## 2018-08-09 PROCEDURE — 81003 URINALYSIS AUTO W/O SCOPE: CPT

## 2018-08-09 PROCEDURE — 0502F SUBSEQUENT PRENATAL CARE: CPT

## 2018-08-09 PROCEDURE — 87491 CHLMYD TRACH DNA AMP PROBE: CPT

## 2018-08-09 PROCEDURE — 85027 COMPLETE CBC AUTOMATED: CPT

## 2018-08-09 PROCEDURE — 80076 HEPATIC FUNCTION PANEL: CPT

## 2018-08-09 PROCEDURE — 86780 TREPONEMA PALLIDUM: CPT

## 2018-08-09 PROCEDURE — 87591 N.GONORRHOEAE DNA AMP PROB: CPT

## 2018-08-09 RX ORDER — CITRIC ACID/SODIUM CITRATE 300-500 MG
15 SOLUTION, ORAL ORAL EVERY 4 HOURS
Qty: 0 | Refills: 0 | Status: DISCONTINUED | OUTPATIENT
Start: 2018-08-09 | End: 2018-08-10

## 2018-08-09 RX ORDER — ONDANSETRON 8 MG/1
4 TABLET, FILM COATED ORAL ONCE
Qty: 0 | Refills: 0 | Status: COMPLETED | OUTPATIENT
Start: 2018-08-09 | End: 2018-08-09

## 2018-08-09 RX ORDER — PENICILLIN G POTASSIUM 5000000 [IU]/1
5 POWDER, FOR SOLUTION INTRAMUSCULAR; INTRAPLEURAL; INTRATHECAL; INTRAVENOUS ONCE
Qty: 0 | Refills: 0 | Status: COMPLETED | OUTPATIENT
Start: 2018-08-09 | End: 2018-08-09

## 2018-08-09 RX ORDER — PENICILLIN G POTASSIUM 5000000 [IU]/1
2.5 POWDER, FOR SOLUTION INTRAMUSCULAR; INTRAPLEURAL; INTRATHECAL; INTRAVENOUS EVERY 4 HOURS
Qty: 0 | Refills: 0 | Status: DISCONTINUED | OUTPATIENT
Start: 2018-08-09 | End: 2018-08-10

## 2018-08-09 RX ORDER — SODIUM CHLORIDE 9 MG/ML
1000 INJECTION, SOLUTION INTRAVENOUS
Qty: 0 | Refills: 0 | Status: DISCONTINUED | OUTPATIENT
Start: 2018-08-09 | End: 2018-08-10

## 2018-08-09 RX ORDER — OXYTOCIN 10 UNIT/ML
333.33 VIAL (ML) INJECTION
Qty: 20 | Refills: 0 | Status: DISCONTINUED | OUTPATIENT
Start: 2018-08-09 | End: 2018-08-10

## 2018-08-09 RX ORDER — SODIUM CHLORIDE 9 MG/ML
1000 INJECTION, SOLUTION INTRAVENOUS ONCE
Qty: 0 | Refills: 0 | Status: COMPLETED | OUTPATIENT
Start: 2018-08-09 | End: 2018-08-09

## 2018-08-09 RX ORDER — URSODIOL 250 MG/1
300 TABLET, FILM COATED ORAL EVERY 8 HOURS
Qty: 0 | Refills: 0 | Status: DISCONTINUED | OUTPATIENT
Start: 2018-08-09 | End: 2018-08-10

## 2018-08-09 RX ORDER — PENICILLIN G POTASSIUM 5000000 [IU]/1
POWDER, FOR SOLUTION INTRAMUSCULAR; INTRAPLEURAL; INTRATHECAL; INTRAVENOUS
Qty: 0 | Refills: 0 | Status: DISCONTINUED | OUTPATIENT
Start: 2018-08-09 | End: 2018-08-10

## 2018-08-09 RX ADMIN — PENICILLIN G POTASSIUM 200 MILLION UNIT(S): 5000000 POWDER, FOR SOLUTION INTRAMUSCULAR; INTRAPLEURAL; INTRATHECAL; INTRAVENOUS at 17:40

## 2018-08-09 RX ADMIN — Medication 12 MILLIGRAM(S): at 19:25

## 2018-08-09 RX ADMIN — SODIUM CHLORIDE 125 MILLILITER(S): 9 INJECTION, SOLUTION INTRAVENOUS at 18:00

## 2018-08-09 RX ADMIN — SODIUM CHLORIDE 125 MILLILITER(S): 9 INJECTION, SOLUTION INTRAVENOUS at 19:15

## 2018-08-09 RX ADMIN — SODIUM CHLORIDE 2000 MILLILITER(S): 9 INJECTION, SOLUTION INTRAVENOUS at 17:00

## 2018-08-09 RX ADMIN — PENICILLIN G POTASSIUM 200 MILLION UNIT(S): 5000000 POWDER, FOR SOLUTION INTRAMUSCULAR; INTRAPLEURAL; INTRATHECAL; INTRAVENOUS at 21:54

## 2018-08-09 RX ADMIN — ONDANSETRON 4 MILLIGRAM(S): 8 TABLET, FILM COATED ORAL at 22:37

## 2018-08-10 ENCOUNTER — APPOINTMENT (OUTPATIENT)
Dept: ANTEPARTUM | Facility: CLINIC | Age: 28
End: 2018-08-10

## 2018-08-10 ENCOUNTER — RESULT REVIEW (OUTPATIENT)
Age: 28
End: 2018-08-10

## 2018-08-10 LAB
ALBUMIN SERPL ELPH-MCNC: 3.4 G/DL — SIGNIFICANT CHANGE UP (ref 3.3–5)
ALP SERPL-CCNC: 237 U/L — HIGH (ref 30–120)
ALT FLD-CCNC: 22 U/L — SIGNIFICANT CHANGE UP (ref 10–45)
AST SERPL-CCNC: 28 U/L — SIGNIFICANT CHANGE UP (ref 10–40)
BILIRUB DIRECT SERPL-MCNC: 0.1 MG/DL — SIGNIFICANT CHANGE UP (ref 0–0.2)
BILIRUB INDIRECT FLD-MCNC: 0.2 MG/DL — SIGNIFICANT CHANGE UP (ref 0.2–1)
BILIRUB SERPL-MCNC: 0.2 MG/DL — SIGNIFICANT CHANGE UP (ref 0.2–1.2)
C TRACH RRNA SPEC QL NAA+PROBE: SIGNIFICANT CHANGE UP
GROUP B BETA STREP DNA (PCR): SIGNIFICANT CHANGE UP
GROUP B BETA STREP INTERPRETATION: SIGNIFICANT CHANGE UP
HIV 1+2 AB+HIV1 P24 AG SERPL QL IA: SIGNIFICANT CHANGE UP
HIV 1+2 AB+HIV1 P24 AG SERPL QL IA: SIGNIFICANT CHANGE UP
N GONORRHOEA RRNA SPEC QL NAA+PROBE: SIGNIFICANT CHANGE UP
PROT SERPL-MCNC: 6.8 G/DL — SIGNIFICANT CHANGE UP (ref 6–8.3)
SOURCE GROUP B STREP: SIGNIFICANT CHANGE UP
SPECIMEN SOURCE: SIGNIFICANT CHANGE UP
T PALLIDUM AB TITR SER: NEGATIVE — SIGNIFICANT CHANGE UP
T PALLIDUM AB TITR SER: NEGATIVE — SIGNIFICANT CHANGE UP

## 2018-08-10 PROCEDURE — 88307 TISSUE EXAM BY PATHOLOGIST: CPT | Mod: 26

## 2018-08-10 PROCEDURE — 59514 CESAREAN DELIVERY ONLY: CPT | Mod: AS,U8

## 2018-08-10 PROCEDURE — 59514 CESAREAN DELIVERY ONLY: CPT | Mod: U9

## 2018-08-10 RX ORDER — LANOLIN
1 OINTMENT (GRAM) TOPICAL
Qty: 0 | Refills: 0 | Status: DISCONTINUED | OUTPATIENT
Start: 2018-08-10 | End: 2018-08-13

## 2018-08-10 RX ORDER — SIMETHICONE 80 MG/1
80 TABLET, CHEWABLE ORAL EVERY 4 HOURS
Qty: 0 | Refills: 0 | Status: DISCONTINUED | OUTPATIENT
Start: 2018-08-10 | End: 2018-08-12

## 2018-08-10 RX ORDER — FERROUS SULFATE 325(65) MG
325 TABLET ORAL DAILY
Qty: 0 | Refills: 0 | Status: DISCONTINUED | OUTPATIENT
Start: 2018-08-10 | End: 2018-08-12

## 2018-08-10 RX ORDER — MORPHINE SULFATE 50 MG/1
3 CAPSULE, EXTENDED RELEASE ORAL ONCE
Qty: 0 | Refills: 0 | Status: DISCONTINUED | OUTPATIENT
Start: 2018-08-10 | End: 2018-08-12

## 2018-08-10 RX ORDER — ACETAMINOPHEN 500 MG
650 TABLET ORAL EVERY 6 HOURS
Qty: 0 | Refills: 0 | Status: DISCONTINUED | OUTPATIENT
Start: 2018-08-10 | End: 2018-08-13

## 2018-08-10 RX ORDER — ACETAMINOPHEN 500 MG
1000 TABLET ORAL ONCE
Qty: 0 | Refills: 0 | Status: DISCONTINUED | OUTPATIENT
Start: 2018-08-10 | End: 2018-08-13

## 2018-08-10 RX ORDER — DIPHENHYDRAMINE HCL 50 MG
25 CAPSULE ORAL EVERY 6 HOURS
Qty: 0 | Refills: 0 | Status: DISCONTINUED | OUTPATIENT
Start: 2018-08-10 | End: 2018-08-13

## 2018-08-10 RX ORDER — DOCUSATE SODIUM 100 MG
100 CAPSULE ORAL
Qty: 0 | Refills: 0 | Status: DISCONTINUED | OUTPATIENT
Start: 2018-08-10 | End: 2018-08-11

## 2018-08-10 RX ORDER — SODIUM CHLORIDE 9 MG/ML
1000 INJECTION, SOLUTION INTRAVENOUS
Qty: 0 | Refills: 0 | Status: DISCONTINUED | OUTPATIENT
Start: 2018-08-10 | End: 2018-08-13

## 2018-08-10 RX ORDER — CEFAZOLIN SODIUM 1 G
2000 VIAL (EA) INJECTION ONCE
Qty: 0 | Refills: 0 | Status: DISCONTINUED | OUTPATIENT
Start: 2018-08-10 | End: 2018-08-13

## 2018-08-10 RX ORDER — HEPARIN SODIUM 5000 [USP'U]/ML
5000 INJECTION INTRAVENOUS; SUBCUTANEOUS EVERY 12 HOURS
Qty: 0 | Refills: 0 | Status: DISCONTINUED | OUTPATIENT
Start: 2018-08-10 | End: 2018-08-13

## 2018-08-10 RX ORDER — LEVOTHYROXINE SODIUM 125 MCG
75 TABLET ORAL DAILY
Qty: 0 | Refills: 0 | Status: DISCONTINUED | OUTPATIENT
Start: 2018-08-10 | End: 2018-08-13

## 2018-08-10 RX ORDER — GLYCERIN ADULT
1 SUPPOSITORY, RECTAL RECTAL AT BEDTIME
Qty: 0 | Refills: 0 | Status: DISCONTINUED | OUTPATIENT
Start: 2018-08-10 | End: 2018-08-12

## 2018-08-10 RX ORDER — AZITHROMYCIN 500 MG/1
500 TABLET, FILM COATED ORAL ONCE
Qty: 0 | Refills: 0 | Status: COMPLETED | OUTPATIENT
Start: 2018-08-10 | End: 2018-08-10

## 2018-08-10 RX ORDER — OXYTOCIN 10 UNIT/ML
41.67 VIAL (ML) INJECTION
Qty: 20 | Refills: 0 | Status: DISCONTINUED | OUTPATIENT
Start: 2018-08-10 | End: 2018-08-13

## 2018-08-10 RX ORDER — TETANUS TOXOID, REDUCED DIPHTHERIA TOXOID AND ACELLULAR PERTUSSIS VACCINE, ADSORBED 5; 2.5; 8; 8; 2.5 [IU]/.5ML; [IU]/.5ML; UG/.5ML; UG/.5ML; UG/.5ML
0.5 SUSPENSION INTRAMUSCULAR ONCE
Qty: 0 | Refills: 0 | Status: COMPLETED | OUTPATIENT
Start: 2018-08-10 | End: 2019-07-09

## 2018-08-10 RX ORDER — KETOROLAC TROMETHAMINE 30 MG/ML
30 SYRINGE (ML) INJECTION EVERY 6 HOURS
Qty: 0 | Refills: 0 | Status: DISCONTINUED | OUTPATIENT
Start: 2018-08-10 | End: 2018-08-11

## 2018-08-10 RX ADMIN — AZITHROMYCIN 250 MILLIGRAM(S): 500 TABLET, FILM COATED ORAL at 10:54

## 2018-08-10 RX ADMIN — URSODIOL 300 MILLIGRAM(S): 250 TABLET, FILM COATED ORAL at 00:03

## 2018-08-10 RX ADMIN — Medication 30 MILLIGRAM(S): at 22:30

## 2018-08-10 RX ADMIN — Medication 75 MICROGRAM(S): at 08:49

## 2018-08-10 RX ADMIN — URSODIOL 300 MILLIGRAM(S): 250 TABLET, FILM COATED ORAL at 06:46

## 2018-08-10 RX ADMIN — PENICILLIN G POTASSIUM 200 MILLION UNIT(S): 5000000 POWDER, FOR SOLUTION INTRAMUSCULAR; INTRAPLEURAL; INTRATHECAL; INTRAVENOUS at 06:30

## 2018-08-10 RX ADMIN — SIMETHICONE 80 MILLIGRAM(S): 80 TABLET, CHEWABLE ORAL at 21:58

## 2018-08-10 RX ADMIN — PENICILLIN G POTASSIUM 200 MILLION UNIT(S): 5000000 POWDER, FOR SOLUTION INTRAMUSCULAR; INTRAPLEURAL; INTRATHECAL; INTRAVENOUS at 12:02

## 2018-08-10 RX ADMIN — Medication 30 MILLIGRAM(S): at 21:58

## 2018-08-10 RX ADMIN — PENICILLIN G POTASSIUM 200 MILLION UNIT(S): 5000000 POWDER, FOR SOLUTION INTRAMUSCULAR; INTRAPLEURAL; INTRATHECAL; INTRAVENOUS at 01:45

## 2018-08-10 NOTE — CHART NOTE - NSCHARTNOTEFT_GEN_A_CORE
Patient seen at bedside, resting comfortably offers no new complaints. Due to ambulate, matute to be removed and due to void in am. Tolerating clear diet at this time.  Attempting breastfeeding.  Denies HA, CP, SOB, N/V/D, dizziness, palpitations, worsening abdominal pain, worsening vaginal bleeding, or any other concerns.      Vital Signs Last 24 Hrs  T(C): 37 (10 Aug 2018 19:30), Max: 37 (10 Aug 2018 19:30)  T(F): 98.6 (10 Aug 2018 19:30), Max: 98.6 (10 Aug 2018 19:30)  HR: 84 (10 Aug 2018 20:30) (84 - 116)  BP: 102/64 (10 Aug 2018 20:30) (102/64 - 133/78)  BP(mean): 73 (10 Aug 2018 20:30) (63 - 77)  RR: 18 (10 Aug 2018 20:30) (18 - 28)  SpO2: 100% (10 Aug 2018 20:30) (98% - 100%)    Gen: A&O x 3, NAD  Chest: CTA B/L  Cardiac: S1, S2  RRR  Breast: Soft, nontender, nonengorged  Abdomen: +BS; soft; NT; ND; Dressing C/D/I  Gyn: Minimal lochia  Ext: Nontender, DTRS 2+, no worsening edema, venodynes intact                          11.7   10.83 )-----------( 242      ( 09 Aug 2018 20:22 )             35.3       A/P: POD #0 s/p c/s       -Pain management as needed  -Advance as per protocol  -incentive spirometer use as instructed  -f/u Rpt CBC in am  -Advance diet with flatus  -Encourage breastfeeding   -d/w Dr Méndez

## 2018-08-11 LAB
BASOPHILS # BLD AUTO: 0.1 K/UL — SIGNIFICANT CHANGE UP (ref 0–0.2)
BASOPHILS NFR BLD AUTO: 0.5 % — SIGNIFICANT CHANGE UP (ref 0–2)
EOSINOPHIL # BLD AUTO: 0.1 K/UL — SIGNIFICANT CHANGE UP (ref 0–0.5)
EOSINOPHIL NFR BLD AUTO: 0.5 % — SIGNIFICANT CHANGE UP (ref 0–6)
HCT VFR BLD CALC: 28 % — LOW (ref 34.5–45)
HGB BLD-MCNC: 9.6 G/DL — LOW (ref 11.5–15.5)
LYMPHOCYTES # BLD AUTO: 19.9 % — SIGNIFICANT CHANGE UP (ref 13–44)
LYMPHOCYTES # BLD AUTO: 3.2 K/UL — SIGNIFICANT CHANGE UP (ref 1–3.3)
MCHC RBC-ENTMCNC: 30.8 PG — SIGNIFICANT CHANGE UP (ref 27–34)
MCHC RBC-ENTMCNC: 34.2 GM/DL — SIGNIFICANT CHANGE UP (ref 32–36)
MCV RBC AUTO: 90.2 FL — SIGNIFICANT CHANGE UP (ref 80–100)
MONOCYTES # BLD AUTO: 1 K/UL — HIGH (ref 0–0.9)
MONOCYTES NFR BLD AUTO: 6 % — SIGNIFICANT CHANGE UP (ref 2–14)
NEUTROPHILS # BLD AUTO: 11.6 K/UL — HIGH (ref 1.8–7.4)
NEUTROPHILS NFR BLD AUTO: 73.1 % — SIGNIFICANT CHANGE UP (ref 43–77)
PLATELET # BLD AUTO: 188 K/UL — SIGNIFICANT CHANGE UP (ref 150–400)
RBC # BLD: 3.1 M/UL — LOW (ref 3.8–5.2)
RBC # FLD: 11.9 % — SIGNIFICANT CHANGE UP (ref 10.3–14.5)
WBC # BLD: 15.9 K/UL — HIGH (ref 3.8–10.5)
WBC # FLD AUTO: 15.9 K/UL — HIGH (ref 3.8–10.5)

## 2018-08-11 RX ORDER — OXYCODONE AND ACETAMINOPHEN 5; 325 MG/1; MG/1
2 TABLET ORAL EVERY 4 HOURS
Qty: 0 | Refills: 0 | Status: DISCONTINUED | OUTPATIENT
Start: 2018-08-11 | End: 2018-08-13

## 2018-08-11 RX ORDER — DOCUSATE SODIUM 100 MG
100 CAPSULE ORAL
Qty: 0 | Refills: 0 | Status: DISCONTINUED | OUTPATIENT
Start: 2018-08-11 | End: 2018-08-12

## 2018-08-11 RX ORDER — IBUPROFEN 200 MG
600 TABLET ORAL EVERY 6 HOURS
Qty: 0 | Refills: 0 | Status: DISCONTINUED | OUTPATIENT
Start: 2018-08-11 | End: 2018-08-13

## 2018-08-11 RX ADMIN — Medication 600 MILLIGRAM(S): at 20:18

## 2018-08-11 RX ADMIN — Medication 30 MILLIGRAM(S): at 14:10

## 2018-08-11 RX ADMIN — Medication 30 MILLIGRAM(S): at 06:30

## 2018-08-11 RX ADMIN — Medication 30 MILLIGRAM(S): at 07:00

## 2018-08-11 RX ADMIN — Medication 75 MICROGRAM(S): at 06:32

## 2018-08-11 RX ADMIN — Medication 600 MILLIGRAM(S): at 21:08

## 2018-08-11 RX ADMIN — Medication 100 MILLIGRAM(S): at 20:18

## 2018-08-11 RX ADMIN — HEPARIN SODIUM 5000 UNIT(S): 5000 INJECTION INTRAVENOUS; SUBCUTANEOUS at 18:28

## 2018-08-11 RX ADMIN — Medication 30 MILLIGRAM(S): at 13:34

## 2018-08-11 RX ADMIN — HEPARIN SODIUM 5000 UNIT(S): 5000 INJECTION INTRAVENOUS; SUBCUTANEOUS at 06:32

## 2018-08-11 RX ADMIN — Medication 1 TABLET(S): at 13:35

## 2018-08-11 RX ADMIN — SIMETHICONE 80 MILLIGRAM(S): 80 TABLET, CHEWABLE ORAL at 20:18

## 2018-08-11 NOTE — PROGRESS NOTE ADULT - PROBLEM SELECTOR PLAN 1
-Pain management as needed  -cont post op care  -adv diet to regular   -OOB and ambulate  -f/u Rpt CBC in am   -encourage insentive spirometer use  -Encourage breastfeeding   -d/w dr. Burgos

## 2018-08-11 NOTE — PROGRESS NOTE ADULT - SUBJECTIVE AND OBJECTIVE BOX
Patient seen at [facility] resting comfortably offers no new complaints. Pt not yet OOB, matute removed, due to void, +flatus; tolerating regular diet. breastfeeding. Denies HA, CP, SOB, N/V/D,  no bm; dizziness, palpitations, worsening abdominal pain, worsening vaginal bleeding, or any other concerns.   Vital Signs Last 24 Hrs  T(C): 36.7 (11 Aug 2018 06:00), Max: 37 (10 Aug 2018 19:30)  T(F): 98 (11 Aug 2018 06:00), Max: 98.6 (10 Aug 2018 19:30)  HR: 92 (11 Aug 2018 06:00) (84 - 116)  BP: 107/70 (11 Aug 2018 06:00) (102/64 - 133/78)  BP(mean): 73 (10 Aug 2018 20:30) (63 - 77)  RR: 17 (11 Aug 2018 06:00) (16 - 28)  SpO2: 96% (11 Aug 2018 06:00) (96% - 100%)    Gen: A&O x 3, NAD  Chest: CTABL  Cardiac: S1+S2+ RRR  Breast: Soft, nontender, nonengorged  Abdomen: +BS; soft; Nontender, nondistended, dressing removed Incision C/D/I steri strips in place   Gyn: Minimal lochia  Extremities: Nontender, DTRS 2+, no worsening edema                        9.6    15.9  )-----------( 188      ( 11 Aug 2018 07:01 )             28.0       A/P: 28 yaer old POD #1 s/p c/s     -Pain management as needed  -cont post op care  -adv diet to regular   -OOB and ambulate  -f/u Rpt CBC in am   -encourage insentive spirometer use  -Encourage breastfeeding   -d/w dr. Burgos

## 2018-08-12 ENCOUNTER — TRANSCRIPTION ENCOUNTER (OUTPATIENT)
Age: 28
End: 2018-08-12

## 2018-08-12 DIAGNOSIS — O26.619 LIVER AND BILIARY TRACT DISORDERS IN PREGNANCY, UNSPECIFIED TRIMESTER: ICD-10-CM

## 2018-08-12 DIAGNOSIS — Z37.9 OUTCOME OF DELIVERY, UNSPECIFIED: ICD-10-CM

## 2018-08-12 LAB
BASOPHILS # BLD AUTO: 0.1 K/UL — SIGNIFICANT CHANGE UP (ref 0–0.2)
BASOPHILS NFR BLD AUTO: 0.7 % — SIGNIFICANT CHANGE UP (ref 0–2)
EOSINOPHIL # BLD AUTO: 0.3 K/UL — SIGNIFICANT CHANGE UP (ref 0–0.5)
EOSINOPHIL NFR BLD AUTO: 2.1 % — SIGNIFICANT CHANGE UP (ref 0–6)
HCT VFR BLD CALC: 28.4 % — LOW (ref 34.5–45)
HGB BLD-MCNC: 9.6 G/DL — LOW (ref 11.5–15.5)
LYMPHOCYTES # BLD AUTO: 25.4 % — SIGNIFICANT CHANGE UP (ref 13–44)
LYMPHOCYTES # BLD AUTO: 3.8 K/UL — HIGH (ref 1–3.3)
MCHC RBC-ENTMCNC: 30.7 PG — SIGNIFICANT CHANGE UP (ref 27–34)
MCHC RBC-ENTMCNC: 33.8 GM/DL — SIGNIFICANT CHANGE UP (ref 32–36)
MCV RBC AUTO: 90.8 FL — SIGNIFICANT CHANGE UP (ref 80–100)
MONOCYTES # BLD AUTO: 0.7 K/UL — SIGNIFICANT CHANGE UP (ref 0–0.9)
MONOCYTES NFR BLD AUTO: 4.8 % — SIGNIFICANT CHANGE UP (ref 2–14)
NEUTROPHILS # BLD AUTO: 10 K/UL — HIGH (ref 1.8–7.4)
NEUTROPHILS NFR BLD AUTO: 67 % — SIGNIFICANT CHANGE UP (ref 43–77)
PLATELET # BLD AUTO: 204 K/UL — SIGNIFICANT CHANGE UP (ref 150–400)
RBC # BLD: 3.12 M/UL — LOW (ref 3.8–5.2)
RBC # FLD: 12 % — SIGNIFICANT CHANGE UP (ref 10.3–14.5)
WBC # BLD: 14.9 K/UL — HIGH (ref 3.8–10.5)
WBC # FLD AUTO: 14.9 K/UL — HIGH (ref 3.8–10.5)

## 2018-08-12 RX ORDER — FERROUS SULFATE 325(65) MG
325 TABLET ORAL
Qty: 0 | Refills: 0 | Status: DISCONTINUED | OUTPATIENT
Start: 2018-08-12 | End: 2018-08-13

## 2018-08-12 RX ORDER — SIMETHICONE 80 MG/1
80 TABLET, CHEWABLE ORAL EVERY 4 HOURS
Qty: 0 | Refills: 0 | Status: DISCONTINUED | OUTPATIENT
Start: 2018-08-12 | End: 2018-08-13

## 2018-08-12 RX ORDER — MAGNESIUM HYDROXIDE 400 MG/1
30 TABLET, CHEWABLE ORAL DAILY
Qty: 0 | Refills: 0 | Status: DISCONTINUED | OUTPATIENT
Start: 2018-08-12 | End: 2018-08-13

## 2018-08-12 RX ORDER — DOCUSATE SODIUM 100 MG
100 CAPSULE ORAL
Qty: 0 | Refills: 0 | Status: DISCONTINUED | OUTPATIENT
Start: 2018-08-12 | End: 2018-08-13

## 2018-08-12 RX ORDER — SENNA PLUS 8.6 MG/1
2 TABLET ORAL AT BEDTIME
Qty: 0 | Refills: 0 | Status: DISCONTINUED | OUTPATIENT
Start: 2018-08-12 | End: 2018-08-13

## 2018-08-12 RX ADMIN — Medication 100 MILLIGRAM(S): at 18:55

## 2018-08-12 RX ADMIN — Medication 600 MILLIGRAM(S): at 05:00

## 2018-08-12 RX ADMIN — Medication 325 MILLIGRAM(S): at 18:55

## 2018-08-12 RX ADMIN — HEPARIN SODIUM 5000 UNIT(S): 5000 INJECTION INTRAVENOUS; SUBCUTANEOUS at 06:42

## 2018-08-12 RX ADMIN — MAGNESIUM HYDROXIDE 30 MILLILITER(S): 400 TABLET, CHEWABLE ORAL at 13:19

## 2018-08-12 RX ADMIN — OXYCODONE AND ACETAMINOPHEN 2 TABLET(S): 5; 325 TABLET ORAL at 13:19

## 2018-08-12 RX ADMIN — Medication 600 MILLIGRAM(S): at 04:37

## 2018-08-12 RX ADMIN — OXYCODONE AND ACETAMINOPHEN 2 TABLET(S): 5; 325 TABLET ORAL at 13:49

## 2018-08-12 RX ADMIN — HEPARIN SODIUM 5000 UNIT(S): 5000 INJECTION INTRAVENOUS; SUBCUTANEOUS at 18:55

## 2018-08-12 RX ADMIN — Medication 600 MILLIGRAM(S): at 23:01

## 2018-08-12 RX ADMIN — Medication 100 MILLIGRAM(S): at 04:40

## 2018-08-12 RX ADMIN — SIMETHICONE 80 MILLIGRAM(S): 80 TABLET, CHEWABLE ORAL at 04:40

## 2018-08-12 RX ADMIN — Medication 1 TABLET(S): at 13:19

## 2018-08-12 RX ADMIN — Medication 75 MICROGRAM(S): at 06:42

## 2018-08-12 RX ADMIN — SIMETHICONE 80 MILLIGRAM(S): 80 TABLET, CHEWABLE ORAL at 18:55

## 2018-08-12 RX ADMIN — SIMETHICONE 80 MILLIGRAM(S): 80 TABLET, CHEWABLE ORAL at 23:01

## 2018-08-12 RX ADMIN — SIMETHICONE 80 MILLIGRAM(S): 80 TABLET, CHEWABLE ORAL at 13:21

## 2018-08-12 RX ADMIN — SENNA PLUS 2 TABLET(S): 8.6 TABLET ORAL at 23:01

## 2018-08-12 NOTE — DISCHARGE NOTE OB - HOSPITAL COURSE
patient presented in early labor @35weeks 1 day with di/di TIUP ICP  s/p beta x1  s/p primary c/s  case mngt set up for twin delivery

## 2018-08-12 NOTE — DISCHARGE NOTE OB - PATIENT PORTAL LINK FT
You can access the [facility] Patient Portal, offered by [facility], by registering with the following website: [email or web address]

## 2018-08-12 NOTE — DISCHARGE NOTE OB - ADDITIONAL INSTRUCTIONS
no sex, nothing in the vagina for 6 weeks, no heavy lifting/pushing, no bending for 4 weeks, no strenuous activity, motrin prn for pain, keep incision clean and dry, shower daily, ambulate, fiber diet, f/u in 2 weeks for wound check and in 6 weeks for postpartum visit  case mngt set up for twins

## 2018-08-12 NOTE — DISCHARGE NOTE OB - MEDICATION SUMMARY - MEDICATIONS TO TAKE
I will START or STAY ON the medications listed below when I get home from the hospital:    ibuprofen 600 mg oral tablet  -- 1 tab(s) by mouth every 6 hours, As needed, moderate pain  -- Indication: For Pain, as needed    Prenatal Multivitamins with Folic Acid 1 mg oral tablet  -- 1 tab(s) by mouth once a day  -- Indication: For vitamin supplement    docusate sodium 100 mg oral capsule  -- 1 cap(s) by mouth 2 times a day  -- Indication: For Stool softener    senna oral tablet  -- 2 tab(s) by mouth once a day (at bedtime)  -- Indication: For Constipation

## 2018-08-12 NOTE — PROGRESS NOTE ADULT - PROBLEM SELECTOR PLAN 1
A/P: 29y/o POD #2 s/p primary c/s@35 weeks 1 day ICP, early labor di/di twins, stable  -case mngt for twin delivery  -Pain management, postop care  -dvt prophylaxis  -OOB and ambulate  -encourage incentive spirometer use  -Encourage breastfeeding   -d/w Dr. Nolberto williamson attending

## 2018-08-12 NOTE — DISCHARGE NOTE OB - CARE PROVIDER_API CALL
[facility],   [facility]   [address]  Phone: [phone]  Fax: (   )    -

## 2018-08-12 NOTE — DISCHARGE NOTE OB - PLAN OF CARE
postop care, pain mngt, breastfeeding education no sex, nothing in the vagina for 6 weeks, no heavy lifting/pushing, no bending for 4 weeks, no strenuous activity, motrin prn for pain, keep incision clean and dry, shower daily, ambulate, fiber diet, f/u in 2 weeks for wound check and in 6 weeks for postpartum visit s/p beta x1 case mngt set up for visiting nurse

## 2018-08-12 NOTE — DISCHARGE NOTE OB - PROVIDER TOKENS
FREE:[LAST:[[facility]],PHONE:[phone]],FAX:[(   )    -],ADDRESS:[[facility]   [address] [place] [address]]]

## 2018-08-12 NOTE — PROGRESS NOTE ADULT - SUBJECTIVE AND OBJECTIVE BOX
OB PA Progress Note POD#2    Patient seen at bedside resting comfortably offers no new complaints. + Ambulation, + void without difficulty, + flatus; tolerating regular diet. both breastfeeding and bottle feeding. Denies HA, CP, SOB, N/V/D,  no bm; dizziness, palpitations, worsening abdominal pain, worsening vaginal bleeding, or any other concerns.   Vital Signs Last 24 Hrs  T(C): 36.9 (12 Aug 2018 05:26), Max: 37.1 (11 Aug 2018 14:51)  T(F): 98.5 (12 Aug 2018 05:26), Max: 98.8 (11 Aug 2018 14:51)  HR: 90 (12 Aug 2018 05:26) (90 - 104)  BP: 115/72 (12 Aug 2018 05:26) (110/77 - 121/68)  BP(mean): --  RR: 17 (12 Aug 2018 05:26) (17 - 18)  SpO2: 98% (12 Aug 2018 05:26) (98% - 99%)    Gen: A&O x 3, NAD  Chest: CTABL  Cardiac: S1+S2+ RRR  Breast: Soft, nontender, nonengorged  Abdomen: +BS; soft; Nontender, nondistended, fundus firm, Incision C/D/I steri strips in place   Gyn: Minimal lochia  Extremities: Nontender, DTRS 2+, no worsening edema                        9.6    14.9  )-----------( 204      ( 12 Aug 2018 06:49 )             28.4

## 2018-08-12 NOTE — DISCHARGE NOTE OB - CARE PLAN
Principal Discharge DX:	 delivery delivered  Goal:	postop care, pain mngt, breastfeeding education  Assessment and plan of treatment:	no sex, nothing in the vagina for 6 weeks, no heavy lifting/pushing, no bending for 4 weeks, no strenuous activity, motrin prn for pain, keep incision clean and dry, shower daily, ambulate, fiber diet, f/u in 2 weeks for wound check and in 6 weeks for postpartum visit  Secondary Diagnosis:	Intrahepatic cholestasis of pregnancy  Secondary Diagnosis:	 delivery  Assessment and plan of treatment:	s/p beta x1  Secondary Diagnosis:	Twin birth  Assessment and plan of treatment:	case mngt set up for visiting nurse

## 2018-08-13 VITALS
TEMPERATURE: 98 F | RESPIRATION RATE: 16 BRPM | DIASTOLIC BLOOD PRESSURE: 73 MMHG | SYSTOLIC BLOOD PRESSURE: 115 MMHG | HEART RATE: 86 BPM | OXYGEN SATURATION: 99 %

## 2018-08-13 LAB
ANION GAP SERPL CALC-SCNC: 5 MMOL/L — SIGNIFICANT CHANGE UP (ref 5–17)
APPEARANCE UR: CLEAR — SIGNIFICANT CHANGE UP
BILIRUB UR-MCNC: NEGATIVE — SIGNIFICANT CHANGE UP
BUN SERPL-MCNC: 7 MG/DL — SIGNIFICANT CHANGE UP (ref 7–18)
CALCIUM SERPL-MCNC: 8.3 MG/DL — LOW (ref 8.4–10.5)
CHLORIDE SERPL-SCNC: 107 MMOL/L — SIGNIFICANT CHANGE UP (ref 96–108)
CO2 SERPL-SCNC: 28 MMOL/L — SIGNIFICANT CHANGE UP (ref 22–31)
COLOR SPEC: SIGNIFICANT CHANGE UP
CREAT SERPL-MCNC: 0.46 MG/DL — LOW (ref 0.5–1.3)
DIFF PNL FLD: ABNORMAL
GLUCOSE SERPL-MCNC: 58 MG/DL — LOW (ref 70–99)
GLUCOSE UR QL: NEGATIVE — SIGNIFICANT CHANGE UP
KETONES UR-MCNC: NEGATIVE — SIGNIFICANT CHANGE UP
LEUKOCYTE ESTERASE UR-ACNC: ABNORMAL
NITRITE UR-MCNC: NEGATIVE — SIGNIFICANT CHANGE UP
PH UR: 8 — SIGNIFICANT CHANGE UP (ref 5–8)
POTASSIUM SERPL-MCNC: 4.1 MMOL/L — SIGNIFICANT CHANGE UP (ref 3.5–5.3)
POTASSIUM SERPL-SCNC: 4.1 MMOL/L — SIGNIFICANT CHANGE UP (ref 3.5–5.3)
PROT UR-MCNC: NEGATIVE — SIGNIFICANT CHANGE UP
SODIUM SERPL-SCNC: 140 MMOL/L — SIGNIFICANT CHANGE UP (ref 135–145)
SP GR SPEC: 1.01 — SIGNIFICANT CHANGE UP (ref 1.01–1.02)
UROBILINOGEN FLD QL: NEGATIVE — SIGNIFICANT CHANGE UP

## 2018-08-13 PROCEDURE — 86901 BLOOD TYPING SEROLOGIC RH(D): CPT

## 2018-08-13 PROCEDURE — 87389 HIV-1 AG W/HIV-1&-2 AB AG IA: CPT

## 2018-08-13 PROCEDURE — 85610 PROTHROMBIN TIME: CPT

## 2018-08-13 PROCEDURE — 86900 BLOOD TYPING SEROLOGIC ABO: CPT

## 2018-08-13 PROCEDURE — 85027 COMPLETE CBC AUTOMATED: CPT

## 2018-08-13 PROCEDURE — G0463: CPT

## 2018-08-13 PROCEDURE — 81001 URINALYSIS AUTO W/SCOPE: CPT

## 2018-08-13 PROCEDURE — 85730 THROMBOPLASTIN TIME PARTIAL: CPT

## 2018-08-13 PROCEDURE — 59050 FETAL MONITOR W/REPORT: CPT

## 2018-08-13 PROCEDURE — 80048 BASIC METABOLIC PNL TOTAL CA: CPT

## 2018-08-13 PROCEDURE — 59025 FETAL NON-STRESS TEST: CPT

## 2018-08-13 PROCEDURE — 86780 TREPONEMA PALLIDUM: CPT

## 2018-08-13 PROCEDURE — 88307 TISSUE EXAM BY PATHOLOGIST: CPT

## 2018-08-13 PROCEDURE — 86923 COMPATIBILITY TEST ELECTRIC: CPT

## 2018-08-13 PROCEDURE — 86703 HIV-1/HIV-2 1 RESULT ANTBDY: CPT

## 2018-08-13 PROCEDURE — 85384 FIBRINOGEN ACTIVITY: CPT

## 2018-08-13 PROCEDURE — 90715 TDAP VACCINE 7 YRS/> IM: CPT

## 2018-08-13 PROCEDURE — 82962 GLUCOSE BLOOD TEST: CPT

## 2018-08-13 PROCEDURE — 86850 RBC ANTIBODY SCREEN: CPT

## 2018-08-13 RX ORDER — DOCUSATE SODIUM 100 MG
1 CAPSULE ORAL
Qty: 40 | Refills: 0
Start: 2018-08-13 | End: 2018-09-01

## 2018-08-13 RX ORDER — SENNA PLUS 8.6 MG/1
2 TABLET ORAL
Qty: 40 | Refills: 0
Start: 2018-08-13 | End: 2018-09-01

## 2018-08-13 RX ORDER — IBUPROFEN 200 MG
1 TABLET ORAL
Qty: 40 | Refills: 0
Start: 2018-08-13 | End: 2018-08-22

## 2018-08-13 RX ORDER — TETANUS TOXOID, REDUCED DIPHTHERIA TOXOID AND ACELLULAR PERTUSSIS VACCINE, ADSORBED 5; 2.5; 8; 8; 2.5 [IU]/.5ML; [IU]/.5ML; UG/.5ML; UG/.5ML; UG/.5ML
0.5 SUSPENSION INTRAMUSCULAR ONCE
Qty: 0 | Refills: 0 | Status: COMPLETED | OUTPATIENT
Start: 2018-08-13 | End: 2018-08-13

## 2018-08-13 RX ADMIN — HEPARIN SODIUM 5000 UNIT(S): 5000 INJECTION INTRAVENOUS; SUBCUTANEOUS at 06:55

## 2018-08-13 RX ADMIN — Medication 100 MILLIGRAM(S): at 06:54

## 2018-08-13 RX ADMIN — Medication 75 MICROGRAM(S): at 06:54

## 2018-08-13 RX ADMIN — Medication 1 TABLET(S): at 12:25

## 2018-08-13 RX ADMIN — TETANUS TOXOID, REDUCED DIPHTHERIA TOXOID AND ACELLULAR PERTUSSIS VACCINE, ADSORBED 0.5 MILLILITER(S): 5; 2.5; 8; 8; 2.5 SUSPENSION INTRAMUSCULAR at 06:55

## 2018-08-13 RX ADMIN — MAGNESIUM HYDROXIDE 30 MILLILITER(S): 400 TABLET, CHEWABLE ORAL at 06:59

## 2018-08-13 RX ADMIN — Medication 325 MILLIGRAM(S): at 06:55

## 2018-08-13 RX ADMIN — Medication 600 MILLIGRAM(S): at 06:54

## 2018-08-13 RX ADMIN — SIMETHICONE 80 MILLIGRAM(S): 80 TABLET, CHEWABLE ORAL at 06:54

## 2018-08-13 RX ADMIN — SIMETHICONE 80 MILLIGRAM(S): 80 TABLET, CHEWABLE ORAL at 11:01

## 2018-08-13 NOTE — PROGRESS NOTE ADULT - PROBLEM SELECTOR PLAN 1
POD 3, clinically stable for discharge  Discharge instructions given, patient verbalizes understanding  follow up in 2 weeks for post op check  prescriptions sent to pharmacy  pain management prn  encourage ambulation and breastfeeding

## 2018-08-13 NOTE — PROGRESS NOTE ADULT - SUBJECTIVE AND OBJECTIVE BOX
Patient seen resting comfortably.  No new complaints. Reports incisional pain, controlled on pain medications.  Denies CP, SOB, N/V/D, dizziness, palpitations. Voiding spontaneously, denies dysuria, urgency or frequency. +Flatus, - BM. Tolerating regular diet.     Vital Signs Last 24 Hrs  T(C): 36.7 (13 Aug 2018 05:50), Max: 36.9 (12 Aug 2018 18:03)  T(F): 98.1 (13 Aug 2018 05:50), Max: 98.4 (12 Aug 2018 18:03)  HR: 86 (13 Aug 2018 05:50) (86 - 101)  BP: 115/73 (13 Aug 2018 05:50) (115/73 - 121/83)  RR: 16 (13 Aug 2018 05:50) (16 - 16)  SpO2: 99% (13 Aug 2018 05:50) (99% - 100%)    Gen: A&O x 3, NAD  Chest: CTABL  Cardiac: S1+S2+ RRR  Breast: Soft, nontender, nonengorged  Abdomen: Nontender, nondistended, +BS, Incision c/d/i, no erythema  Gyn: lochia wnl  Extremities: Nontender, no worsening edema                          9.6    14.9  )-----------( 204      ( 12 Aug 2018 06:49 )             28.4

## 2018-08-14 DIAGNOSIS — O30.043 TWIN PREGNANCY, DICHORIONIC/DIAMNIOTIC, THIRD TRIMESTER: ICD-10-CM

## 2018-08-14 DIAGNOSIS — O26.613 LIVER AND BILIARY TRACT DISORDERS IN PREGNANCY, THIRD TRIMESTER: ICD-10-CM

## 2018-08-14 DIAGNOSIS — E03.9 HYPOTHYROIDISM, UNSPECIFIED: ICD-10-CM

## 2018-08-14 DIAGNOSIS — Z34.93 ENCOUNTER FOR SUPERVISION OF NORMAL PREGNANCY, UNSPECIFIED, THIRD TRIMESTER: ICD-10-CM

## 2018-08-16 ENCOUNTER — APPOINTMENT (OUTPATIENT)
Dept: ANTEPARTUM | Facility: CLINIC | Age: 28
End: 2018-08-16

## 2018-08-16 ENCOUNTER — APPOINTMENT (OUTPATIENT)
Dept: OBGYN | Facility: CLINIC | Age: 28
End: 2018-08-16

## 2018-08-28 ENCOUNTER — OUTPATIENT (OUTPATIENT)
Dept: OUTPATIENT SERVICES | Facility: HOSPITAL | Age: 28
LOS: 1 days | End: 2018-08-28
Payer: MEDICAID

## 2018-08-28 ENCOUNTER — APPOINTMENT (OUTPATIENT)
Dept: OBGYN | Facility: CLINIC | Age: 28
End: 2018-08-28
Payer: MEDICAID

## 2018-08-28 VITALS
BODY MASS INDEX: 23.39 KG/M2 | SYSTOLIC BLOOD PRESSURE: 111 MMHG | HEART RATE: 81 BPM | HEIGHT: 64 IN | TEMPERATURE: 98.2 F | WEIGHT: 137 LBS | DIASTOLIC BLOOD PRESSURE: 73 MMHG

## 2018-08-28 DIAGNOSIS — Z98.890 OTHER SPECIFIED POSTPROCEDURAL STATES: Chronic | ICD-10-CM

## 2018-08-28 DIAGNOSIS — Z34.00 ENCOUNTER FOR SUPERVISION OF NORMAL FIRST PREGNANCY, UNSPECIFIED TRIMESTER: ICD-10-CM

## 2018-08-28 PROCEDURE — 99213 OFFICE O/P EST LOW 20 MIN: CPT | Mod: NC

## 2018-08-28 PROCEDURE — 81003 URINALYSIS AUTO W/O SCOPE: CPT

## 2018-08-28 PROCEDURE — G0463: CPT

## 2018-08-29 DIAGNOSIS — Z09 ENCOUNTER FOR FOLLOW-UP EXAMINATION AFTER COMPLETED TREATMENT FOR CONDITIONS OTHER THAN MALIGNANT NEOPLASM: ICD-10-CM

## 2018-09-05 ENCOUNTER — MEDICATION RENEWAL (OUTPATIENT)
Age: 28
End: 2018-09-05

## 2018-09-24 ENCOUNTER — APPOINTMENT (OUTPATIENT)
Dept: OBGYN | Facility: CLINIC | Age: 28
End: 2018-09-24

## 2018-09-27 ENCOUNTER — APPOINTMENT (OUTPATIENT)
Dept: OBGYN | Facility: CLINIC | Age: 28
End: 2018-09-27

## 2018-10-09 ENCOUNTER — APPOINTMENT (OUTPATIENT)
Dept: OBGYN | Facility: CLINIC | Age: 28
End: 2018-10-09

## 2019-04-05 ENCOUNTER — TRANSCRIPTION ENCOUNTER (OUTPATIENT)
Age: 29
End: 2019-04-05

## 2019-08-08 NOTE — PATIENT PROFILE OB - AS DELIV CSECT TYPE BABY A OB
Not troubled much   Certain foods triggers Acid reflux , tries to avoid them   No recent problem with acidreflux    unscheduled/primary

## 2020-07-02 NOTE — PROGRESS NOTE ADULT - PROBLEM/PLAN-2
- Hx T2DM with reported hypoglycemic episodes including on prior hospital presentations for falls  - SSI  - Hold home metformin and januvia    #Lactic acidosis  - Patient is perfusing, appears to be mentating improved compared to prior, trend to clear, no concern for acute infection at this time unless hemodynamics change or a new source of infection is identified DISPLAY PLAN FREE TEXT

## 2020-10-22 NOTE — PATIENT PROFILE OB - ADVANCE DIRECTIVE (BEHAVIORAL HEALTHCARE), PROFILE
[facility] Emergency Dept  [address] [place] [address]  Phone: [phone]  Fax: [phone]                                    Maco Chen   MRN: [id number]    Department: [facility] Emergency Dept   Date of Visit: 10/22/2020           After Visit Summary Signature Page    I have received my discharge instructions, and my questions have been answered. I have discussed any challenges I see with this plan with the nurse or doctor.    ..........................................................................................................................................  Patient/Patient Representative Signature      ..........................................................................................................................................  Patient Representative Print Name and Relationship to Patient    ..................................................               ................................................  Date                                   Time    ..........................................................................................................................................  Reviewed by Signature/Title    ...................................................              ..............................................  Date                                               Time          22EPIC Rev 08/18       
no

## 2020-12-21 NOTE — DISCHARGE NOTE ANTEPARTUM - PROVIDER TOKENS
Patient Specific Counseling (Will Not Stick From Patient To Patient): - Disc’d removal with LN2 if bothersome, pt deferred Detail Level: Detailed Patient Specific Counseling (Will Not Stick From Patient To Patient): - Assured benign, no tx necessary\\n- Disc’d with pt presence of spider veins are not indicative of circulation pathologies Detail Level: Zone Patient Specific Counseling (Will Not Stick From Patient To Patient): - Assured 3AKs on scalp tx’ed with LN2 1mo prior have all resolved today\\n- Assured no further tx needed Detail Level: Simple FREE:[LAST:[[facility]],PHONE:[(   )    -],FAX:[(   )    -]]

## 2021-06-22 NOTE — DISCHARGE NOTE ANTEPARTUM - IF YOU ARE A SMOKER, IT IS IMPORTANT FOR YOUR HEALTH TO STOP SMOKING. PLEASE BE AWARE THAT SECOND HAND SMOKE IS ALSO HARMFUL.
PATIENT INFORMATION    Anticipatory guidance discussed  Drugs, ETOH, and tobacco  Importance of regular dental care  Importance of regular exercise  Importance of varied diet  Minimize junk food    Follow-Up  - Return in 1 year for your yearly well visit.    13-17 years old Health and Safety Tips - The following hyperlinks are available to access via #waywire    Parent Education from [employer]    Educación para padres sobre niños sanos    Additional Educational Resources:  For additional resources regarding your symptoms, diagnosis, or further health information, please visit the Discover a Healthier You section on [email or web address] or the Online Health Resources section in #waywire.  
Statement Selected

## 2021-08-26 ENCOUNTER — APPOINTMENT (OUTPATIENT)
Dept: FAMILY MEDICINE | Facility: CLINIC | Age: 31
End: 2021-08-26
Payer: COMMERCIAL

## 2021-08-26 VITALS
BODY MASS INDEX: 26.98 KG/M2 | WEIGHT: 158 LBS | RESPIRATION RATE: 17 BRPM | HEART RATE: 88 BPM | TEMPERATURE: 98.2 F | SYSTOLIC BLOOD PRESSURE: 110 MMHG | OXYGEN SATURATION: 98 % | DIASTOLIC BLOOD PRESSURE: 60 MMHG | HEIGHT: 64 IN

## 2021-08-26 LAB
BASOPHILS # BLD AUTO: 0.04 K/UL
BASOPHILS NFR BLD AUTO: 0.5 %
EOSINOPHIL # BLD AUTO: 0.18 K/UL
EOSINOPHIL NFR BLD AUTO: 2.3 %
HCT VFR BLD CALC: 37.8 %
HGB BLD-MCNC: 12.4 G/DL
IMM GRANULOCYTES NFR BLD AUTO: 0.4 %
LYMPHOCYTES # BLD AUTO: 2.71 K/UL
LYMPHOCYTES NFR BLD AUTO: 35.1 %
MAN DIFF?: NORMAL
MCHC RBC-ENTMCNC: 29.1 PG
MCHC RBC-ENTMCNC: 32.8 GM/DL
MCV RBC AUTO: 88.7 FL
MONOCYTES # BLD AUTO: 0.51 K/UL
MONOCYTES NFR BLD AUTO: 6.6 %
NEUTROPHILS # BLD AUTO: 4.25 K/UL
NEUTROPHILS NFR BLD AUTO: 55.1 %
PLATELET # BLD AUTO: 236 K/UL
RBC # BLD: 4.26 M/UL
RBC # FLD: 12.7 %
WBC # FLD AUTO: 7.72 K/UL

## 2021-08-26 PROCEDURE — 99385 PREV VISIT NEW AGE 18-39: CPT | Mod: 25

## 2021-08-26 RX ORDER — MULTIVIT-MIN/FOLIC/VIT K/LYCOP 400-300MCG
28-0.8 TABLET ORAL DAILY
Qty: 30 | Refills: 9 | Status: DISCONTINUED | COMMUNITY
Start: 2018-04-30 | End: 2021-08-26

## 2021-08-26 RX ORDER — CHLORHEXIDINE GLUCONATE 4 %
325 (65 FE) LIQUID (ML) TOPICAL TWICE DAILY
Qty: 60 | Refills: 2 | Status: DISCONTINUED | COMMUNITY
Start: 2018-05-31 | End: 2021-08-26

## 2021-08-26 RX ORDER — CLOTRIMAZOLE AND BETAMETHASONE DIPROPIONATE 10; .5 MG/G; MG/G
1-0.05 CREAM TOPICAL TWICE DAILY
Qty: 1 | Refills: 0 | Status: DISCONTINUED | COMMUNITY
Start: 2018-04-30 | End: 2021-08-26

## 2021-08-26 RX ORDER — ASPIRIN 81 MG/1
81 TABLET, CHEWABLE ORAL DAILY
Qty: 50 | Refills: 5 | Status: DISCONTINUED | COMMUNITY
Start: 2018-03-05 | End: 2021-08-26

## 2021-08-26 RX ORDER — URSODIOL 300 MG/1
300 CAPSULE ORAL
Qty: 90 | Refills: 0 | Status: DISCONTINUED | COMMUNITY
Start: 2018-08-06 | End: 2021-08-26

## 2021-08-26 RX ORDER — PROGESTERONE 200 MG/1
200 CAPSULE ORAL
Qty: 30 | Refills: 5 | Status: DISCONTINUED | COMMUNITY
Start: 2018-06-04 | End: 2021-08-26

## 2021-08-26 NOTE — HEALTH RISK ASSESSMENT
[Good] : ~his/her~  mood as  good [No] : In the past 12 months have you used drugs other than those required for medical reasons? No [With Family] : lives with family [# of Members in Household ___] :  household currently consist of [unfilled] member(s) [] :  [# Of Children ___] : has [unfilled] children [Sexually Active] : sexually active [Feels Safe at Home] : Feels safe at home [Fully functional (bathing, dressing, toileting, transferring, walking, feeding)] : Fully functional (bathing, dressing, toileting, transferring, walking, feeding) [Fully functional (using the telephone, shopping, preparing meals, housekeeping, doing laundry, using] : Fully functional and needs no help or supervision to perform IADLs (using the telephone, shopping, preparing meals, housekeeping, doing laundry, using transportation, managing medications and managing finances) [Smoke Detector] : smoke detector [Seat Belt] :  uses seat belt [Travel to Developing Areas] : travel to developing areas [] : No [de-identified] : former social smoker [YearQuit] : 2005 [Change in mental status noted] : No change in mental status noted [Language] : denies difficulty with language [Handling Complex Tasks] : denies difficulty handling complex tasks [High Risk Behavior] : no high risk behavior [Reports changes in hearing] : Reports no changes in hearing [Reports changes in vision] : Reports no changes in vision [Reports changes in dental health] : Reports no changes in dental health [de-identified] : [place]

## 2021-08-26 NOTE — COUNSELING
[Benefits of weight loss discussed] : Benefits of weight loss discussed [Encouraged to increase physical activity] : Encouraged to increase physical activity [Decrease Portions] : decrease portions [Needs reinforcement, provided] : Patient needs reinforcement on understanding of disease, goals and obesity follow-up plan; reinforcement was provided [FreeTextEntry2] : reduce red meats including beef, goat, cheese

## 2021-08-26 NOTE — PHYSICAL EXAM
[No Acute Distress] : no acute distress [Well Nourished] : well nourished [Well Developed] : well developed [Well-Appearing] : well-appearing [Normal Sclera/Conjunctiva] : normal sclera/conjunctiva [EOMI] : extraocular movements intact [Normal Outer Ear/Nose] : the outer ears and nose were normal in appearance [Normal TMs] : both tympanic membranes were normal [No JVD] : no jugular venous distention [No Lymphadenopathy] : no lymphadenopathy [Supple] : supple [Thyroid Normal, No Nodules] : the thyroid was normal and there were no nodules present [No Respiratory Distress] : no respiratory distress  [No Accessory Muscle Use] : no accessory muscle use [Clear to Auscultation] : lungs were clear to auscultation bilaterally [Normal Rate] : normal rate  [Regular Rhythm] : with a regular rhythm [Normal S1, S2] : normal S1 and S2 [No Murmur] : no murmur heard [No Abdominal Bruit] : a ~M bruit was not heard ~T in the abdomen [No Varicosities] : no varicosities [Pedal Pulses Present] : the pedal pulses are present [No Edema] : there was no peripheral edema [No Palpable Aorta] : no palpable aorta [No Extremity Clubbing/Cyanosis] : no extremity clubbing/cyanosis [Normal Appearance] : normal in appearance [No Axillary Lymphadenopathy] : no axillary lymphadenopathy [Soft] : abdomen soft [Non Tender] : non-tender [Non-distended] : non-distended [No Masses] : no abdominal mass palpated [No HSM] : no HSM [Normal Bowel Sounds] : normal bowel sounds [Normal Posterior Cervical Nodes] : no posterior cervical lymphadenopathy [Normal Anterior Cervical Nodes] : no anterior cervical lymphadenopathy [No CVA Tenderness] : no CVA  tenderness [No Spinal Tenderness] : no spinal tenderness [No Joint Swelling] : no joint swelling [Grossly Normal Strength/Tone] : grossly normal strength/tone [No Rash] : no rash [Coordination Grossly Intact] : coordination grossly intact [No Focal Deficits] : no focal deficits [Normal Gait] : normal gait [Normal Affect] : the affect was normal [Alert and Oriented x3] : oriented to person, place, and time [Normal Insight/Judgement] : insight and judgment were intact [de-identified] : obese [de-identified] : tense trapezius

## 2021-08-26 NOTE — HISTORY OF PRESENT ILLNESS
[FreeTextEntry1] : 31 year old female who presents today for a complete physical exam.\par c/o constipation, occasionally takes something to help her go\par

## 2021-08-27 LAB
25(OH)D3 SERPL-MCNC: 23.2 NG/ML
ALBUMIN SERPL ELPH-MCNC: 4.7 G/DL
ALP BLD-CCNC: 74 U/L
ALT SERPL-CCNC: 21 U/L
ANION GAP SERPL CALC-SCNC: 14 MMOL/L
AST SERPL-CCNC: 21 U/L
BILIRUB SERPL-MCNC: 0.4 MG/DL
BUN SERPL-MCNC: 14 MG/DL
CALCIUM SERPL-MCNC: 9.4 MG/DL
CHLORIDE SERPL-SCNC: 102 MMOL/L
CHOLEST SERPL-MCNC: 162 MG/DL
CO2 SERPL-SCNC: 24 MMOL/L
CREAT SERPL-MCNC: 0.8 MG/DL
GLUCOSE SERPL-MCNC: 54 MG/DL
HDLC SERPL-MCNC: 43 MG/DL
INSULIN SERPL-MCNC: 55 UU/ML
LDLC SERPL CALC-MCNC: 81 MG/DL
NONHDLC SERPL-MCNC: 119 MG/DL
POTASSIUM SERPL-SCNC: 3.7 MMOL/L
PROT SERPL-MCNC: 7.6 G/DL
SODIUM SERPL-SCNC: 140 MMOL/L
T3FREE SERPL-MCNC: 2.83 PG/ML
T4 FREE SERPL-MCNC: 1.4 NG/DL
TRIGL SERPL-MCNC: 191 MG/DL
TSH SERPL-ACNC: 2.59 UIU/ML

## 2021-10-04 ENCOUNTER — NON-APPOINTMENT (OUTPATIENT)
Age: 31
End: 2021-10-04

## 2021-10-04 ENCOUNTER — APPOINTMENT (OUTPATIENT)
Dept: FAMILY MEDICINE | Facility: CLINIC | Age: 31
End: 2021-10-04
Payer: COMMERCIAL

## 2021-10-04 VITALS
BODY MASS INDEX: 26.46 KG/M2 | SYSTOLIC BLOOD PRESSURE: 112 MMHG | DIASTOLIC BLOOD PRESSURE: 72 MMHG | HEIGHT: 64 IN | WEIGHT: 155 LBS | HEART RATE: 84 BPM | TEMPERATURE: 98.3 F | OXYGEN SATURATION: 99 %

## 2021-10-04 DIAGNOSIS — Z34.93 ENCOUNTER FOR SUPERVISION OF NORMAL PREGNANCY, UNSPECIFIED, THIRD TRIMESTER: ICD-10-CM

## 2021-10-04 DIAGNOSIS — Z23 ENCOUNTER FOR IMMUNIZATION: ICD-10-CM

## 2021-10-04 PROCEDURE — 99214 OFFICE O/P EST MOD 30 MIN: CPT

## 2021-10-04 RX ORDER — PSYLLIUM HUSK (WITH SUGAR) 3 G/12 G
28.3 POWDER (GRAM) ORAL TWICE DAILY
Qty: 1 | Refills: 3 | Status: ACTIVE | COMMUNITY
Start: 2021-08-26 | End: 1900-01-01

## 2021-10-04 NOTE — PHYSICAL EXAM
[No Acute Distress] : no acute distress [Normal Sclera/Conjunctiva] : normal sclera/conjunctiva [Normal Outer Ear/Nose] : the outer ears and nose were normal in appearance [No JVD] : no jugular venous distention [Normal] : normal rate, regular rhythm, normal S1 and S2 and no murmur heard [Coordination Grossly Intact] : coordination grossly intact [Normal Affect] : the affect was normal [Alert and Oriented x3] : oriented to person, place, and time [Normal Insight/Judgement] : insight and judgment were intact [de-identified] : obese

## 2021-10-04 NOTE — HISTORY OF PRESENT ILLNESS
[FreeTextEntry1] : has not started fiber yet, still dealing w/ constipation\par c/o metformin causing headache and nausea\par would like med to help lose wgt; needs med refills\par

## 2022-02-17 ENCOUNTER — RX RENEWAL (OUTPATIENT)
Age: 32
End: 2022-02-17

## 2022-03-03 ENCOUNTER — APPOINTMENT (OUTPATIENT)
Dept: FAMILY MEDICINE | Facility: CLINIC | Age: 32
End: 2022-03-03

## 2022-04-21 ENCOUNTER — RX RENEWAL (OUTPATIENT)
Age: 32
End: 2022-04-21

## 2022-04-22 ENCOUNTER — RX RENEWAL (OUTPATIENT)
Age: 32
End: 2022-04-22

## 2022-05-16 ENCOUNTER — RX RENEWAL (OUTPATIENT)
Age: 32
End: 2022-05-16

## 2022-05-21 ENCOUNTER — RX RENEWAL (OUTPATIENT)
Age: 32
End: 2022-05-21

## 2022-05-31 ENCOUNTER — APPOINTMENT (OUTPATIENT)
Dept: FAMILY MEDICINE | Facility: CLINIC | Age: 32
End: 2022-05-31
Payer: COMMERCIAL

## 2022-05-31 VITALS
BODY MASS INDEX: 30.63 KG/M2 | HEIGHT: 60 IN | DIASTOLIC BLOOD PRESSURE: 74 MMHG | TEMPERATURE: 98.2 F | WEIGHT: 156 LBS | SYSTOLIC BLOOD PRESSURE: 115 MMHG | HEART RATE: 86 BPM | OXYGEN SATURATION: 98 %

## 2022-05-31 DIAGNOSIS — T78.40XA ALLERGY, UNSPECIFIED, INITIAL ENCOUNTER: ICD-10-CM

## 2022-05-31 PROCEDURE — 96372 THER/PROPH/DIAG INJ SC/IM: CPT

## 2022-05-31 PROCEDURE — 99213 OFFICE O/P EST LOW 20 MIN: CPT | Mod: 25

## 2022-05-31 RX ORDER — METHYLPREDNISOLONE ACETATE 20 MG/ML
20 INJECTION, SUSPENSION INTRALESIONAL; INTRAMUSCULAR; INTRASYNOVIAL; SOFT TISSUE
Qty: 1 | Refills: 0 | Status: COMPLETED | OUTPATIENT
Start: 2022-05-31

## 2022-05-31 RX ADMIN — METHYLPREDNISOLONE ACETATE 0 MG/ML: 20 INJECTION, SUSPENSION INTRALESIONAL; INTRAMUSCULAR; INTRASYNOVIAL; SOFT TISSUE at 00:00

## 2022-06-27 ENCOUNTER — RX RENEWAL (OUTPATIENT)
Age: 32
End: 2022-06-27

## 2022-06-27 NOTE — ED PROVIDER NOTE - CPE EDP NEURO NORM
[facility]    Assessment/Plan:    *KRAIG  -Improving.   -Suspect ATN (relative hypotension). DDx includes renovascular disease + ARB or AIN.  -UA with WBCs, but no RBC, protein.   -US without obstructive uropathy.   -Cont to hold ARB.   -Lasix 40mg iv once for fluid overload. Redose as needed.     *CKD stage IIIa  -Baseline creatinine around 1.1 - 1.3mg/dl.     *AF, h/o ICD for VT  -Cardiology following.     *Bilateral LE DVTs.   -Anticoagulation per primary, cardiology.     Discussed with RN.       _____________________________    Reason for Nephrology Follow Up:   KRAIG     Basic Information:   81 year old female medical history of atrial fibrillation, ventricular tachycardia status post ICD placement was admitted due to dyspnea, lower extremity edema, diarrhea and lower extremity edema.    Feels short of breath. Leg edema worse.     Review of Systems:  Review of Systems   Constitutional: Positive for activity change, appetite change and fatigue.   Neurological: Positive for weakness.        Last Recorded Vitals:  Blood pressure 99/62, pulse 82, temperature 98.4 °F (36.9 °C), temperature source Oral, resp. rate 16, height 5' 6\" (1.676 m), weight 70.7 kg (155 lb 13.8 oz), SpO2 99 %.    Last weight:  (unable to obtain, patient in chair and unable to get on scale due to pain in BLE. Will endors to AM Team) (06/27/22 0600)    Admission weight: Weight: 64 kg (141 lb 1.5 oz) (06/23/22 1237)    Intake/Output:    Intake/Output Summary (Last 24 hours) at 6/27/2022 1357  Last data filed at 6/27/2022 0239  Gross per 24 hour   Intake 964.29 ml   Output --   Net 964.29 ml       Medications:  Current Facility-Administered Medications   Medication Dose Route Frequency Provider Last Rate Last Admin   • warfarin (COUMADIN) tablet 2.5 mg  2.5 mg Oral Once Dulce Tellez MD       • WARFARIN - PHARMACIST MONITORED Misc   Does not apply See Admin Instructions Dulce Tellez MD       •  AMIODarone (PACERONE) tablet 100 mg  100 mg Oral Daily Shakeel Blevins MD   100 mg at 06/26/22 2021   • dilTIAZem (TIAZAC,CARDIZEM CD) 24 hr capsule 120 mg  120 mg Oral Daily Shakeel Blevins MD   120 mg at 06/27/22 0921   • [Held by provider] metoPROLOL tartrate (LOPRESSOR) tablet 50 mg  50 mg Oral BID Shakeel Blevins MD   50 mg at 06/24/22 0908   • sertraline (ZOLOFT) tablet 25 mg  25 mg Oral Daily Shakeel Blevins MD   25 mg at 06/27/22 0920   • sodium chloride (PF) 0.9 % injection 2 mL  2 mL Intracatheter 2 times per day Shakeel Blevins MD   2 mL at 06/27/22 0921      Current Facility-Administered Medications   Medication Dose Route Frequency Provider Last Rate Last Admin   • heparin (porcine) 25,000 units/250 mL in dextrose 5 % infusion  1-40 Units/kg/hr (Dosing Weight) Intravenous Continuous Shakeel Blevins MD 10.9 mL/hr at 06/27/22 1233 17 Units/kg/hr at 06/27/22 1233        Physical Exam:  Physical Exam  HENT:      Head: Normocephalic and atraumatic.      Nose: Nose normal.      Mouth/Throat:      Mouth: Mucous membranes are moist.      Pharynx: Oropharynx is clear.      Neck: Normal range of motion and neck supple.   Cardiovascular:      Rate and Rhythm: Normal rate and regular rhythm.      Pulses: Normal pulses.      Heart sounds: Normal heart sounds.   Pulmonary:      Effort: Pulmonary effort is normal.      Breath sounds: Normal breath sounds.   Abdominal:      General: Abdomen is flat.      Palpations: Abdomen is soft.   Musculoskeletal:         General: Normal range of motion.      Right lower leg: Edema present.      Left lower leg: Edema present.   Skin:     General: Skin is warm and dry.   Neurological:      Mental Status: She is alert. Mental status is at baseline.   Psychiatric:         Mood and Affect: Mood normal.         Thought Content: Thought content normal.         Laboratory Results:  Recent Labs     06/25/22  0523 06/26/22  0533 06/27/22  0514   SODIUM 135 130* 128*   POTASSIUM 4.5 4.7 5.1    CHLORIDE 104 99 98   CO2 23 23 25   ANIONGAP 13 13 10   GLUCOSE 96 113* 116*   BUN 41* 47* 49*   CREATININE 2.58* 2.10* 2.01*   CALCIUM 8.3* 8.7 9.1   WBC 8.4 9.1 10.1   HGB 9.4* 9.5* 9.7*   * 135* 173       Urine studies  Recent Labs   Lab 06/25/22  0548   USPG 1.012   UPROT Negative   UWBC Large*   URBC Negative   UNITR Negative   UBILI Negative   UPH 5.0   UROB 0.2   FLO 9      normal...

## 2022-07-01 ENCOUNTER — APPOINTMENT (OUTPATIENT)
Dept: FAMILY MEDICINE | Facility: CLINIC | Age: 32
End: 2022-07-01

## 2022-07-22 ENCOUNTER — RX RENEWAL (OUTPATIENT)
Age: 32
End: 2022-07-22

## 2022-08-15 ENCOUNTER — RX RENEWAL (OUTPATIENT)
Age: 32
End: 2022-08-15

## 2022-08-29 ENCOUNTER — APPOINTMENT (OUTPATIENT)
Dept: FAMILY MEDICINE | Facility: CLINIC | Age: 32
End: 2022-08-29

## 2022-08-29 PROCEDURE — 99213 OFFICE O/P EST LOW 20 MIN: CPT | Mod: 95

## 2022-09-27 ENCOUNTER — APPOINTMENT (OUTPATIENT)
Dept: FAMILY MEDICINE | Facility: CLINIC | Age: 32
End: 2022-09-27

## 2022-09-27 NOTE — DISCHARGE NOTE OB - NSTOBACCOHOTLINE_GEN_A_CS
[facility] Smokers Quitline ([phone]) Topical Ketoconazole Counseling: Patient counseled that this medication may cause skin irritation or allergic reactions.  In the event of skin irritation, the patient was advised to reduce the amount of the drug applied or use it less frequently.   The patient verbalized understanding of the proper use and possible adverse effects of ketoconazole.  All of the patient's questions and concerns were addressed.

## 2022-10-24 NOTE — PATIENT PROFILE OB - BREAST MILK IS MORE DIGESTIBLE, MAKING VOMITING, DIARRHEA, GAS AND CONSTIPATION LESS COMMON
Occupational Therapy  Treatment    Daina Kinsey   MRN: [id number]   Admitting Diagnosis: Stroke    OT Date of Treatment: 10/24/22   OT Start Time: 1449  OT Stop Time: 1501  OT Total Time (min): 12 min     Billable Minutes:  Therapeutic Activity 12  Total Minutes: 12     OT/XANDER: XANDER     XANDER Visit Number: 2    General Precautions: Standard, fall (SBP <140)  Orthopedic Precautions: N/A  Braces: N/A    Spiritual, Cultural Beliefs, Jainism Practices, Values that Affect Care: no    Subjective:  Communicated with nurse prior to session.    Objective:  Patient found with: telemetry, pulse ox (continuous)    Functional Mobility:    Grooming:  Patient performed face washing with Moderate Assistance at supported long sit in bed with verbal and tactile cues to complete task.    Additional Treatment:  Total A to reposition patient in bed.  AAROM and PROM to BUE    Patient left right sidelying with all lines intact and call button in reach    ASSESSMENT:  Daina Kinsey is a 82 y.o. female with a medical diagnosis of Stroke and presents with decreased cognition, decreased Ax tolerance, decreased ADL skills and decreased balance.    Rehab potential is fair    Activity tolerance: Fair    Discharge recommendations: nursing facility, skilled     Equipment recommendations:       GOALS:   Multidisciplinary Problems       Occupational Therapy Goals          Problem: Occupational Therapy    Goal Priority Disciplines Outcome Interventions   Occupational Therapy Goal     OT, PT/OT Ongoing, Progressing    Description: Goals to be met by: 10/25/2022     Patient will increase functional independence with ADLs by performing:    Feeding with Set-up Assistance.  UE Dressing with Moderate Assistance.  LE Dressing with Moderate Assistance.  Grooming while seated at sink with Minimal Assistance.  Toileting from bed level with Maximum Assistance for hygiene and clothing management.                          Plan:  Patient to be seen 3 x/week, 4  x/week to address the above listed problems via self-care/home management, therapeutic activities, therapeutic exercises  Plan of Care expires: 11/02/22  Plan of Care reviewed with: patient         10/24/2022   Statement Selected

## 2022-11-01 ENCOUNTER — APPOINTMENT (OUTPATIENT)
Dept: FAMILY MEDICINE | Facility: CLINIC | Age: 32
End: 2022-11-01

## 2022-11-01 VITALS
TEMPERATURE: 97.8 F | DIASTOLIC BLOOD PRESSURE: 70 MMHG | WEIGHT: 156 LBS | SYSTOLIC BLOOD PRESSURE: 115 MMHG | HEIGHT: 60 IN | BODY MASS INDEX: 30.63 KG/M2 | OXYGEN SATURATION: 98 % | HEART RATE: 75 BPM

## 2022-11-01 DIAGNOSIS — Z87.42 PERSONAL HISTORY OF OTHER DISEASES OF THE FEMALE GENITAL TRACT: ICD-10-CM

## 2022-11-01 DIAGNOSIS — E88.81 METABOLIC SYNDROME: ICD-10-CM

## 2022-11-01 DIAGNOSIS — Z87.19 PERSONAL HISTORY OF OTHER DISEASES OF THE DIGESTIVE SYSTEM: ICD-10-CM

## 2022-11-01 DIAGNOSIS — E66.9 OBESITY, UNSPECIFIED: ICD-10-CM

## 2022-11-01 DIAGNOSIS — E55.9 VITAMIN D DEFICIENCY, UNSPECIFIED: ICD-10-CM

## 2022-11-01 DIAGNOSIS — Z00.00 ENCOUNTER FOR GENERAL ADULT MEDICAL EXAMINATION W/OUT ABNORMAL FINDINGS: ICD-10-CM

## 2022-11-01 DIAGNOSIS — Z23 ENCOUNTER FOR IMMUNIZATION: ICD-10-CM

## 2022-11-01 DIAGNOSIS — E78.5 HYPERLIPIDEMIA, UNSPECIFIED: ICD-10-CM

## 2022-11-01 DIAGNOSIS — O30.043 TWIN PREGNANCY, DICHORIONIC/DIAMNIOTIC, THIRD TRIMESTER: ICD-10-CM

## 2022-11-01 DIAGNOSIS — K83.1 LIVER AND BILIARY TRACT DISORDERS IN PREGNANCY, THIRD TRIMESTER: ICD-10-CM

## 2022-11-01 DIAGNOSIS — T38.3X5A ADVERSE EFFECT OF INSULIN AND ORAL HYPOGLYCEMIC [ANTIDIABETIC] DRUGS, INITIAL ENCOUNTER: ICD-10-CM

## 2022-11-01 DIAGNOSIS — O26.613 LIVER AND BILIARY TRACT DISORDERS IN PREGNANCY, THIRD TRIMESTER: ICD-10-CM

## 2022-11-01 PROCEDURE — 90686 IIV4 VACC NO PRSV 0.5 ML IM: CPT

## 2022-11-01 PROCEDURE — 99395 PREV VISIT EST AGE 18-39: CPT | Mod: 25

## 2022-11-01 PROCEDURE — 36415 COLL VENOUS BLD VENIPUNCTURE: CPT

## 2022-11-01 PROCEDURE — G0008: CPT

## 2022-11-01 RX ORDER — PNV NO.95/FERROUS FUM/FOLIC AC 28MG-0.8MG
TABLET ORAL
Refills: 0 | Status: ACTIVE | COMMUNITY
Start: 2022-11-01

## 2022-11-01 RX ORDER — ZINC OXIDE 13 %
CREAM (GRAM) TOPICAL
Refills: 0 | Status: ACTIVE | COMMUNITY
Start: 2022-11-01

## 2022-11-01 RX ORDER — NALTREXONE HYDROCHLORIDE AND BUPROPION HYDROCHLORIDE 8; 90 MG/1; MG/1
8-90 TABLET, EXTENDED RELEASE ORAL
Qty: 90 | Refills: 0 | Status: DISCONTINUED | COMMUNITY
Start: 2021-10-04 | End: 2022-11-01

## 2022-11-01 RX ORDER — METFORMIN ER 500 MG 500 MG/1
500 TABLET ORAL
Refills: 0 | Status: DISCONTINUED | COMMUNITY
Start: 2021-10-04 | End: 2022-11-01

## 2022-11-01 NOTE — HEALTH RISK ASSESSMENT
[Never] : Never [No] : In the past 12 months have you used drugs other than those required for medical reasons? No [Good] : ~his/her~  mood as  good [de-identified] : no exercise [de-identified] : losing carbs

## 2022-11-01 NOTE — HISTORY OF PRESENT ILLNESS
[FreeTextEntry1] : 32 year old female who presents today for a complete physical exam.\par started low carb/fat diet, has lost approx 15 lbs in over 3 mths, feeling great

## 2022-11-02 DIAGNOSIS — J35.8 OTHER CHRONIC DISEASES OF TONSILS AND ADENOIDS: ICD-10-CM

## 2022-11-02 LAB
25(OH)D3 SERPL-MCNC: 40.2 NG/ML
ALBUMIN SERPL ELPH-MCNC: 4.5 G/DL
ALP BLD-CCNC: 80 U/L
ALT SERPL-CCNC: 17 U/L
ANION GAP SERPL CALC-SCNC: 13 MMOL/L
APPEARANCE: CLEAR
AST SERPL-CCNC: 24 U/L
BILIRUB SERPL-MCNC: 0.2 MG/DL
BILIRUBIN URINE: NEGATIVE
BLOOD URINE: NEGATIVE
BUN SERPL-MCNC: 4 MG/DL
CALCIUM SERPL-MCNC: 9.4 MG/DL
CHLORIDE SERPL-SCNC: 104 MMOL/L
CHOLEST SERPL-MCNC: 158 MG/DL
CO2 SERPL-SCNC: 26 MMOL/L
COLOR: NORMAL
CREAT SERPL-MCNC: 0.57 MG/DL
EGFR: 125 ML/MIN/1.73M2
ESTIMATED AVERAGE GLUCOSE: 97 MG/DL
GLUCOSE QUALITATIVE U: NEGATIVE
GLUCOSE SERPL-MCNC: 77 MG/DL
HBA1C MFR BLD HPLC: 5 %
HDLC SERPL-MCNC: 47 MG/DL
INSULIN SERPL-MCNC: 6.8 UU/ML
KETONES URINE: NEGATIVE
LDLC SERPL CALC-MCNC: 93 MG/DL
LEUKOCYTE ESTERASE URINE: NEGATIVE
NITRITE URINE: NEGATIVE
NONHDLC SERPL-MCNC: 111 MG/DL
PH URINE: 6.5
POTASSIUM SERPL-SCNC: 4 MMOL/L
PROT SERPL-MCNC: 7.4 G/DL
PROTEIN URINE: NEGATIVE
SODIUM SERPL-SCNC: 143 MMOL/L
SPECIFIC GRAVITY URINE: 1.01
T3FREE SERPL-MCNC: 2.79 PG/ML
T4 FREE SERPL-MCNC: 1.4 NG/DL
TRIGL SERPL-MCNC: 89 MG/DL
TSH SERPL-ACNC: 1.1 UIU/ML
UROBILINOGEN URINE: NORMAL

## 2022-11-07 LAB
BASOPHILS # BLD AUTO: 0.04 K/UL
BASOPHILS NFR BLD AUTO: 0.5 %
EOSINOPHIL # BLD AUTO: 0.15 K/UL
EOSINOPHIL NFR BLD AUTO: 1.9 %
HCT VFR BLD CALC: 37.6 %
HGB BLD-MCNC: 12.1 G/DL
IMM GRANULOCYTES NFR BLD AUTO: 0.1 %
LYMPHOCYTES # BLD AUTO: 2.23 K/UL
LYMPHOCYTES NFR BLD AUTO: 27.9 %
MAN DIFF?: NORMAL
MCHC RBC-ENTMCNC: 29.2 PG
MCHC RBC-ENTMCNC: 32.2 GM/DL
MCV RBC AUTO: 90.6 FL
MONOCYTES # BLD AUTO: 0.37 K/UL
MONOCYTES NFR BLD AUTO: 4.6 %
NEUTROPHILS # BLD AUTO: 5.19 K/UL
NEUTROPHILS NFR BLD AUTO: 65 %
PLATELET # BLD AUTO: 268 K/UL
RBC # BLD: 4.15 M/UL
RBC # FLD: 13.5 %
WBC # FLD AUTO: 7.99 K/UL

## 2022-12-06 ENCOUNTER — TRANSCRIPTION ENCOUNTER (OUTPATIENT)
Age: 32
End: 2022-12-06

## 2023-02-06 RX ORDER — METFORMIN HYDROCHLORIDE 500 MG/1
500 TABLET, COATED ORAL DAILY
Qty: 90 | Refills: 0 | Status: ACTIVE | COMMUNITY
Start: 2021-08-27 | End: 1900-01-01

## 2023-02-06 RX ORDER — ERGOCALCIFEROL 1.25 MG/1
1.25 MG CAPSULE, LIQUID FILLED ORAL
Qty: 12 | Refills: 0 | Status: ACTIVE | COMMUNITY
Start: 2021-08-27 | End: 1900-01-01

## 2023-02-28 ENCOUNTER — EMERGENCY (EMERGENCY)
Facility: HOSPITAL | Age: 33
LOS: 0 days | Discharge: HOME | End: 2023-02-28
Attending: STUDENT IN AN ORGANIZED HEALTH CARE EDUCATION/TRAINING PROGRAM
Payer: COMMERCIAL

## 2023-02-28 VITALS
HEART RATE: 98 BPM | RESPIRATION RATE: 18 BRPM | OXYGEN SATURATION: 100 % | SYSTOLIC BLOOD PRESSURE: 115 MMHG | TEMPERATURE: 98 F | DIASTOLIC BLOOD PRESSURE: 68 MMHG

## 2023-02-28 DIAGNOSIS — E03.9 HYPOTHYROIDISM, UNSPECIFIED: ICD-10-CM

## 2023-02-28 DIAGNOSIS — O99.891 OTHER SPECIFIED DISEASES AND CONDITIONS COMPLICATING PREGNANCY: ICD-10-CM

## 2023-02-28 DIAGNOSIS — N85.8 OTHER SPECIFIED NONINFLAMMATORY DISORDERS OF UTERUS: ICD-10-CM

## 2023-02-28 DIAGNOSIS — Z98.891 HISTORY OF UTERINE SCAR FROM PREVIOUS SURGERY: Chronic | ICD-10-CM

## 2023-02-28 DIAGNOSIS — Z98.890 OTHER SPECIFIED POSTPROCEDURAL STATES: Chronic | ICD-10-CM

## 2023-02-28 DIAGNOSIS — O99.281 ENDOCRINE, NUTRITIONAL AND METABOLIC DISEASES COMPLICATING PREGNANCY, FIRST TRIMESTER: ICD-10-CM

## 2023-02-28 DIAGNOSIS — Z3A.01 LESS THAN 8 WEEKS GESTATION OF PREGNANCY: ICD-10-CM

## 2023-02-28 DIAGNOSIS — M54.50 LOW BACK PAIN, UNSPECIFIED: ICD-10-CM

## 2023-02-28 DIAGNOSIS — O26.851 SPOTTING COMPLICATING PREGNANCY, FIRST TRIMESTER: ICD-10-CM

## 2023-02-28 DIAGNOSIS — R73.03 PREDIABETES: ICD-10-CM

## 2023-02-28 LAB
ALBUMIN SERPL ELPH-MCNC: 3.9 G/DL — SIGNIFICANT CHANGE UP (ref 3.3–5)
ALP SERPL-CCNC: 60 U/L — SIGNIFICANT CHANGE UP (ref 40–120)
ALT FLD-CCNC: 17 U/L — SIGNIFICANT CHANGE UP (ref 12–78)
ANION GAP SERPL CALC-SCNC: 6 MMOL/L — SIGNIFICANT CHANGE UP (ref 5–17)
APPEARANCE UR: CLEAR — SIGNIFICANT CHANGE UP
AST SERPL-CCNC: 18 U/L — SIGNIFICANT CHANGE UP (ref 15–37)
BACTERIA # UR AUTO: ABNORMAL
BASOPHILS # BLD AUTO: 0.03 K/UL — SIGNIFICANT CHANGE UP (ref 0–0.2)
BASOPHILS NFR BLD AUTO: 0.5 % — SIGNIFICANT CHANGE UP (ref 0–2)
BILIRUB SERPL-MCNC: 0.3 MG/DL — SIGNIFICANT CHANGE UP (ref 0.2–1.2)
BILIRUB UR-MCNC: NEGATIVE — SIGNIFICANT CHANGE UP
BLD GP AB SCN SERPL QL: SIGNIFICANT CHANGE UP
BUN SERPL-MCNC: 9 MG/DL — SIGNIFICANT CHANGE UP (ref 7–23)
CALCIUM SERPL-MCNC: 9.3 MG/DL — SIGNIFICANT CHANGE UP (ref 8.5–10.1)
CHLORIDE SERPL-SCNC: 110 MMOL/L — HIGH (ref 96–108)
CO2 SERPL-SCNC: 26 MMOL/L — SIGNIFICANT CHANGE UP (ref 22–31)
COLOR SPEC: YELLOW — SIGNIFICANT CHANGE UP
CREAT SERPL-MCNC: 0.64 MG/DL — SIGNIFICANT CHANGE UP (ref 0.5–1.3)
DIFF PNL FLD: NEGATIVE — SIGNIFICANT CHANGE UP
EGFR: 120 ML/MIN/1.73M2 — SIGNIFICANT CHANGE UP
EOSINOPHIL # BLD AUTO: 0.11 K/UL — SIGNIFICANT CHANGE UP (ref 0–0.5)
EOSINOPHIL NFR BLD AUTO: 1.7 % — SIGNIFICANT CHANGE UP (ref 0–6)
EPI CELLS # UR: ABNORMAL
GLUCOSE SERPL-MCNC: 88 MG/DL — SIGNIFICANT CHANGE UP (ref 70–99)
GLUCOSE UR QL: NEGATIVE MG/DL — SIGNIFICANT CHANGE UP
HCG SERPL-ACNC: 995 MIU/ML — HIGH
HCT VFR BLD CALC: 35.5 % — SIGNIFICANT CHANGE UP (ref 34.5–45)
HGB BLD-MCNC: 11.9 G/DL — SIGNIFICANT CHANGE UP (ref 11.5–15.5)
IMM GRANULOCYTES NFR BLD AUTO: 0.3 % — SIGNIFICANT CHANGE UP (ref 0–0.9)
KETONES UR-MCNC: NEGATIVE — SIGNIFICANT CHANGE UP
LEUKOCYTE ESTERASE UR-ACNC: ABNORMAL
LYMPHOCYTES # BLD AUTO: 2.1 K/UL — SIGNIFICANT CHANGE UP (ref 1–3.3)
LYMPHOCYTES # BLD AUTO: 31.5 % — SIGNIFICANT CHANGE UP (ref 13–44)
MCHC RBC-ENTMCNC: 29.4 PG — SIGNIFICANT CHANGE UP (ref 27–34)
MCHC RBC-ENTMCNC: 33.5 G/DL — SIGNIFICANT CHANGE UP (ref 32–36)
MCV RBC AUTO: 87.7 FL — SIGNIFICANT CHANGE UP (ref 80–100)
MONOCYTES # BLD AUTO: 0.41 K/UL — SIGNIFICANT CHANGE UP (ref 0–0.9)
MONOCYTES NFR BLD AUTO: 6.2 % — SIGNIFICANT CHANGE UP (ref 2–14)
NEUTROPHILS # BLD AUTO: 3.99 K/UL — SIGNIFICANT CHANGE UP (ref 1.8–7.4)
NEUTROPHILS NFR BLD AUTO: 59.8 % — SIGNIFICANT CHANGE UP (ref 43–77)
NITRITE UR-MCNC: NEGATIVE — SIGNIFICANT CHANGE UP
NRBC # BLD: 0 /100 WBCS — SIGNIFICANT CHANGE UP (ref 0–0)
PH UR: 7 — SIGNIFICANT CHANGE UP (ref 5–8)
PLATELET # BLD AUTO: 284 K/UL — SIGNIFICANT CHANGE UP (ref 150–400)
POTASSIUM SERPL-MCNC: 3.8 MMOL/L — SIGNIFICANT CHANGE UP (ref 3.5–5.3)
POTASSIUM SERPL-SCNC: 3.8 MMOL/L — SIGNIFICANT CHANGE UP (ref 3.5–5.3)
PROT SERPL-MCNC: 7.9 GM/DL — SIGNIFICANT CHANGE UP (ref 6–8.3)
PROT UR-MCNC: 15 MG/DL
RBC # BLD: 4.05 M/UL — SIGNIFICANT CHANGE UP (ref 3.8–5.2)
RBC # FLD: 13.2 % — SIGNIFICANT CHANGE UP (ref 10.3–14.5)
RBC CASTS # UR COMP ASSIST: SIGNIFICANT CHANGE UP /HPF (ref 0–4)
SODIUM SERPL-SCNC: 142 MMOL/L — SIGNIFICANT CHANGE UP (ref 135–145)
SP GR SPEC: 1.01 — SIGNIFICANT CHANGE UP (ref 1.01–1.02)
UROBILINOGEN FLD QL: NEGATIVE MG/DL — SIGNIFICANT CHANGE UP
WBC # BLD: 6.66 K/UL — SIGNIFICANT CHANGE UP (ref 3.8–10.5)
WBC # FLD AUTO: 6.66 K/UL — SIGNIFICANT CHANGE UP (ref 3.8–10.5)
WBC UR QL: SIGNIFICANT CHANGE UP

## 2023-02-28 PROCEDURE — 99284 EMERGENCY DEPT VISIT MOD MDM: CPT

## 2023-02-28 PROCEDURE — 76830 TRANSVAGINAL US NON-OB: CPT | Mod: 26

## 2023-02-28 NOTE — ED ADULT NURSE NOTE - BREATH SOUNDS, LEFT
clear
Airway patent, Nasal mucosa clear. Mouth with normal mucosa. Throat has no vesicles, no oropharyngeal exudates and uvula is midline.

## 2023-02-28 NOTE — ED ADULT NURSE NOTE - CHIEF COMPLAINT QUOTE
missed her period and wants to confirm with blood test for pregnancy LMP 1/26/2023 with c/o breast tenderness and saw brown spotting a few days ago. pt AAO x 4 ambulatory with steady gait  denies Chest pain Nausea vomiting dizziness fevers Night sweats chills or sob or bleeding  at present time

## 2023-02-28 NOTE — ED PROVIDER NOTE - PATIENT PORTAL LINK FT
You can access the FollowMyHealth Patient Portal offered by [facility] by registering at the following website: [email or web address]. By joining [facility]’s FollowMyHealth portal, you will also be able to view your health information using other applications (apps) compatible with our system.

## 2023-02-28 NOTE — ED PROVIDER NOTE - PROGRESS NOTE DETAILS
CUBA Fernandez, NP:  HCG correlates w/ +pregnancy test. TVUS shows small cystic intrauterine structure w/ no visualization of yolk sac or fetal pole possibly 2/2 earliness of gestation.  Indeterminate viability.  No ectopic seen but cannot be r/o at this time.  All results d/w pt and all questions answered.  Will d/c pt to f/u w/ ob/gyn.

## 2023-02-28 NOTE — ED PROVIDER NOTE - PHYSICAL EXAMINATION
GEN: Pt in NAD, A&O x4  PSYCH: Affect appropriate.  HEAD: Head NCAT, Neck supple FROM.  EYES: Sclera white w/o injection.   ENT: No nasal d/c. MMM. uvula midline, no oral lesions.  RESP: CTA b/l, no wheezes, rales, or rhonchi.   CARDIAC: RRR, clear distinct S1, S2, no appreciable murmurs.  ABD: Abdomen soft, non-tender. No CVAT b/l.  MSK: No joint erythema or obvious deformity. No obvious spinal deformity, no midline spinal ttp, no step-offs. FROM b/l UE and LE w/o pain.  VASC: 2+ radial pulses b/l.  SKIN: No rashes, lacerations, or ecchymoses on the trunk.

## 2023-02-28 NOTE — ED PROVIDER NOTE - NSFOLLOWUPCLINICS_GEN_ALL_ED_FT
[facility]  OB/GYN & Surg  [address] [place] [id number]  Phone: [phone]  Fax:     [facility]  OB-GYN  [address] [place] [address]  Phone: [phone]  Fax:     [facility]  OBGYN  [address] [place] [address]  Phone: [phone]  Fax: [phone]    
clear

## 2023-02-28 NOTE — ED ADULT NURSE NOTE - NSIMPLEMENTINTERV_GEN_ALL_ED
Implemented All Universal Safety Interventions:  [place] to call system. Call bell, personal items and telephone within reach. Instruct patient to call for assistance. Room bathroom lighting operational. Non-slip footwear when patient is off stretcher. Physically safe environment: no spills, clutter or unnecessary equipment. Stretcher in lowest position, wheels locked, appropriate side rails in place.

## 2023-02-28 NOTE — ED ADULT NURSE NOTE - NS ED NOTE  TALK SOMEONE YN
Physical Therapy  Cancellation/No-show Note  Patient Name:  Joseline Dave  :  1969   Date:  2023  Cancelled visits to date: 1  No-shows to date: 1    For today's appointment patient:  [x]  Cancelled  []  Rescheduled appointment  []  No-show     Reason given by patient:  []  Patient ill  []  Conflicting appointment  []  No transportation    []  Conflict with work  []  No reason given  [x]  Other:     Comments:  Patient arrives today stating that she had a hospital admittance this past Friday for suspected stroke. Instructed to have PCP clear her for continued rehab when she sees her PCP on Friday. Electronically signed by:   Levar Quinteros, 10:05 AM 2023 No

## 2023-02-28 NOTE — ED ADULT NURSE NOTE - OBJECTIVE STATEMENT
Pt took a at home pregnancy test and wants to confirm if it is correct. LMP January 26. Pt had 3 pregnancy and 2 boys. Pt reports headache, night sweats, chills, back pain and dizziness. Denies N/V.

## 2023-02-28 NOTE — ED PROVIDER NOTE - NSFOLLOWUPINSTRUCTIONS_ED_ALL_ED_FT
You were seen in the emergency department for a positive pregnancy test.  You had blood tests and an ultrasound.  The results were discussed with you by your provider and a copy has been included in your discharge paperwork.  Please read all additional instructions below, and follow-up with all providers as directed.    1) Schedule a follow-up appt with OB/GYN to establish care.    2) Begin OTC prenatal vitamins.  Take as directed.    3) Rest and stay hydrated. Pain can be managed with Acetaminophen (aka Tylenol) over the counter as directed.    4) Return to the ER for any new or worsening symptoms.      Please read all attached patient information.

## 2023-02-28 NOTE — ED PROVIDER NOTE - CLINICAL SUMMARY MEDICAL DECISION MAKING FREE TEXT BOX
33 y.o. female () w/ PMHx thyroid disease and pre-DM (on Metformin prophylactically per pt) presents to the ED for confirmation of IUP.  LMP 23.  Has been having mild low back discomfort.  One episode of light brown spotting x 1 day about ten days ago.  Home pregnancy test positive two days ago.  Plan to obtain hcg, type and screen, basic labs, and TVUS to verify IUP.  If all normal, pt can f/u with outpatient OB/GYN.

## 2023-02-28 NOTE — ED ADULT TRIAGE NOTE - CHIEF COMPLAINT QUOTE
missed her period and wants to confirm with blood test for pregnancy LMP 1/26/2023 with c/o breast tenderness . pt AAO x 4 ambulatory with steady gait  denies Chest pain Nausea vomiting dizziness fevers Night sweats chills or sob or bleeding  at present time missed her period and wants to confirm with blood test for pregnancy LMP 1/26/2023 with c/o breast tenderness and saw brown spotting a few days ago. pt AAO x 4 ambulatory with steady gait  denies Chest pain Nausea vomiting dizziness fevers Night sweats chills or sob or bleeding  at present time

## 2023-02-28 NOTE — ED PROVIDER NOTE - OBJECTIVE STATEMENT
33 y.o. female, 33 y.o. female ( 33 y.o. female () w/ PMHx thyroid disease and pre-DM (on Metformin prophylactically per pt) presents to the ED for confirmation of IUP.  Pt reports LMP 23 and recent missed period.  Home pregnancy test two days ago was positive.  Had light brown spotting x 1 day about ten days ago.  Also endorsing mild low back pain. No fever, chills, N/V/D, chest pain, abdominal pain, headache, active vaginal bleeding, or dysuria.

## 2023-02-28 NOTE — ED PROVIDER NOTE - NS ED ATTENDING STATEMENT MOD
This was a shared visit with the VENKAT. I reviewed and verified the documentation and independently performed the documented:

## 2023-02-28 NOTE — ED PROVIDER NOTE - ATTENDING APP SHARED VISIT CONTRIBUTION OF CARE
32 y/o F with PMH thyroid disease and DM presents to the ED for pregnancy. States she had a positive home pregnancy test and would like confirmation. LMP 1/26/23. Patient reports no active vaginal bleeding or abdominal pain.   On arrival, vitals stable.  The patient is well appearing in NAD.  She has no active abd tenderness.  Will obtain TVUS to evaluate for IUP.   Check labs.  Reassess.

## 2023-03-01 PROBLEM — E03.9 HYPOTHYROIDISM, UNSPECIFIED: Chronic | Status: ACTIVE | Noted: 2023-02-28

## 2023-03-01 LAB
CULTURE RESULTS: SIGNIFICANT CHANGE UP
SPECIMEN SOURCE: SIGNIFICANT CHANGE UP

## 2023-03-17 ENCOUNTER — RX RENEWAL (OUTPATIENT)
Age: 33
End: 2023-03-17

## 2023-03-17 RX ORDER — LEVOTHYROXINE SODIUM 0.07 MG/1
75 TABLET ORAL DAILY
Qty: 90 | Refills: 1 | Status: ACTIVE | COMMUNITY
Start: 2018-03-06 | End: 1900-01-01

## 2023-03-28 ENCOUNTER — APPOINTMENT (OUTPATIENT)
Dept: OBGYN | Facility: HOSPITAL | Age: 33
End: 2023-03-28

## 2023-04-12 ENCOUNTER — APPOINTMENT (OUTPATIENT)
Dept: OBGYN | Facility: CLINIC | Age: 33
End: 2023-04-12
Payer: COMMERCIAL

## 2023-04-12 ENCOUNTER — ASOB RESULT (OUTPATIENT)
Age: 33
End: 2023-04-12

## 2023-04-12 VITALS
WEIGHT: 121 LBS | HEIGHT: 60 IN | DIASTOLIC BLOOD PRESSURE: 92 MMHG | HEART RATE: 87 BPM | BODY MASS INDEX: 23.75 KG/M2 | SYSTOLIC BLOOD PRESSURE: 133 MMHG

## 2023-04-12 DIAGNOSIS — Z32.00 ENCOUNTER FOR PREGNANCY TEST, RESULT UNKNOWN: ICD-10-CM

## 2023-04-12 PROCEDURE — 99212 OFFICE O/P EST SF 10 MIN: CPT

## 2023-04-12 PROCEDURE — 76816 OB US FOLLOW-UP PER FETUS: CPT

## 2023-04-12 NOTE — HISTORY OF PRESENT ILLNESS
[FreeTextEntry1] : 34 y/o  LMP 23 presents for confirmation of pregnancy. TVUS confirms SIUP at 10w6d with ISAÍAS 23, consistent with LMP. Patient reports uncomplicated pregnancy so far, has some nausea but tolerating PO. Denies cramping or bleeding. Desired pregnancy.\par \par OB: eTOP x1 with D&C; SAB x1;  x1 at 35+ weeks, twins in labor\par GYN: denies\par PMH: hypothyroid, pre-diabetes?\par PSH:  x1; D&C\par FH: denies\par Meds: PNV, levothyroxine 75mcg, metformin\par Allergies: NKDA\par Social: denies toxic habits

## 2023-04-12 NOTE — PLAN
[FreeTextEntry1] : 34 y/o  at 10w6d (ISAÍAS 23 by LMP 23 c/w 1st trimester sono) for initial prenatal visit\par \par Plan:\par - Welcome letter provided\par - INformation for patient portal given\par - 1st OB labs today, including TSH\par - pap up to date; GC/CT and urine culture collected\par - NIPS today, referral for NT given to patient for next week; discussed dx testing with [facility] and amniocentesis\par - Early pregnancy precautions discussed\par - continue PNV\par - RTO in 1 week for NT and 4 weeks for RPN

## 2023-04-13 ENCOUNTER — TRANSCRIPTION ENCOUNTER (OUTPATIENT)
Age: 33
End: 2023-04-13

## 2023-04-13 DIAGNOSIS — E03.9 HYPOTHYROIDISM, UNSPECIFIED: ICD-10-CM

## 2023-04-13 LAB
ABO + RH PNL BLD: NORMAL
BASOPHILS # BLD AUTO: 0.03 K/UL
BASOPHILS NFR BLD AUTO: 0.3 %
BLD GP AB SCN SERPL QL: NORMAL
C TRACH RRNA SPEC QL NAA+PROBE: NOT DETECTED
EOSINOPHIL # BLD AUTO: 0.13 K/UL
EOSINOPHIL NFR BLD AUTO: 1.1 %
ESTIMATED AVERAGE GLUCOSE: 97 MG/DL
HBA1C MFR BLD HPLC: 5 %
HBV SURFACE AG SER QL: NONREACTIVE
HCT VFR BLD CALC: 32.7 %
HCV AB SER QL: NONREACTIVE
HCV S/CO RATIO: 0.14 S/CO
HGB BLD-MCNC: 10.5 G/DL
HIV1+2 AB SPEC QL IA.RAPID: NONREACTIVE
IMM GRANULOCYTES NFR BLD AUTO: 0.4 %
LYMPHOCYTES # BLD AUTO: 2.8 K/UL
LYMPHOCYTES NFR BLD AUTO: 24.6 %
MAN DIFF?: NORMAL
MCHC RBC-ENTMCNC: 29.9 PG
MCHC RBC-ENTMCNC: 32.1 GM/DL
MCV RBC AUTO: 93.2 FL
MEV IGG FLD QL IA: 22.7 AU/ML
MEV IGG+IGM SER-IMP: POSITIVE
MONOCYTES # BLD AUTO: 0.56 K/UL
MONOCYTES NFR BLD AUTO: 4.9 %
N GONORRHOEA RRNA SPEC QL NAA+PROBE: NOT DETECTED
NEUTROPHILS # BLD AUTO: 7.79 K/UL
NEUTROPHILS NFR BLD AUTO: 68.7 %
PLATELET # BLD AUTO: 236 K/UL
RBC # BLD: 3.51 M/UL
RBC # FLD: 14.4 %
RUBV IGG FLD-ACNC: 3.9 INDEX
RUBV IGG SER-IMP: POSITIVE
SOURCE AMPLIFICATION: NORMAL
T PALLIDUM AB SER QL IA: NEGATIVE
TSH SERPL-ACNC: 2.02 UIU/ML
VZV AB TITR SER: POSITIVE
VZV IGG SER IF-ACNC: 518.9 INDEX
WBC # FLD AUTO: 11.36 K/UL

## 2023-04-14 LAB
BACTERIA UR CULT: NORMAL
HGB A MFR BLD: 96.6 %
HGB A2 MFR BLD: 2 %
HGB F MFR BLD: 1.4 %
HGB FRACT BLD-IMP: NORMAL

## 2023-04-19 LAB
AR GENE MUT ANL BLD/T: NORMAL
CFTR MUT TESTED BLD/T: NEGATIVE

## 2023-04-21 LAB — FMR1 GENE MUT ANL BLD/T: NORMAL

## 2023-04-25 ENCOUNTER — NON-APPOINTMENT (OUTPATIENT)
Age: 33
End: 2023-04-25

## 2023-04-26 ENCOUNTER — APPOINTMENT (OUTPATIENT)
Dept: OBGYN | Facility: HOSPITAL | Age: 33
End: 2023-04-26

## 2023-04-27 ENCOUNTER — APPOINTMENT (OUTPATIENT)
Dept: ANTEPARTUM | Facility: CLINIC | Age: 33
End: 2023-04-27

## 2023-05-10 ENCOUNTER — APPOINTMENT (OUTPATIENT)
Dept: OBGYN | Facility: CLINIC | Age: 33
End: 2023-05-10
Payer: COMMERCIAL

## 2023-05-10 VITALS
WEIGHT: 119 LBS | HEART RATE: 77 BPM | SYSTOLIC BLOOD PRESSURE: 120 MMHG | BODY MASS INDEX: 23.36 KG/M2 | HEIGHT: 60 IN | DIASTOLIC BLOOD PRESSURE: 78 MMHG

## 2023-05-10 PROCEDURE — 99212 OFFICE O/P EST SF 10 MIN: CPT

## 2023-05-11 LAB — TSH SERPL-ACNC: 0.99 UIU/ML

## 2023-06-07 ENCOUNTER — NON-APPOINTMENT (OUTPATIENT)
Age: 33
End: 2023-06-07

## 2023-06-07 ENCOUNTER — APPOINTMENT (OUTPATIENT)
Dept: OBGYN | Facility: CLINIC | Age: 33
End: 2023-06-07
Payer: COMMERCIAL

## 2023-06-07 VITALS
BODY MASS INDEX: 23.16 KG/M2 | WEIGHT: 118 LBS | SYSTOLIC BLOOD PRESSURE: 113 MMHG | HEIGHT: 60 IN | DIASTOLIC BLOOD PRESSURE: 77 MMHG

## 2023-06-07 PROCEDURE — 99212 OFFICE O/P EST SF 10 MIN: CPT

## 2023-06-08 LAB — TSH SERPL-ACNC: 0.9 UIU/ML

## 2023-06-12 LAB
AFP MOM: 1
AFP VALUE: 59.3 NG/ML
ALPHA FETOPROTEIN SERUM COMMENT: NORMAL
ALPHA FETOPROTEIN SERUM INTERPRETATION: NORMAL
ALPHA FETOPROTEIN SERUM RESULTS: NORMAL
ALPHA FETOPROTEIN SERUM TEST RESULTS: NORMAL
GESTATIONAL AGE BASED ON: NORMAL
GESTATIONAL AGE ON COLLECTION DATE: 18.9 WEEKS
INSULIN DEP DIABETES: NO
MATERNAL AGE AT EDD AFP: 32.8 YR
MULTIPLE GESTATION: NO
OSBR RISK 1 IN: NORMAL
RACE: NORMAL
WEIGHT AFP: 118 LBS

## 2023-06-20 ENCOUNTER — APPOINTMENT (OUTPATIENT)
Dept: ANTEPARTUM | Facility: CLINIC | Age: 33
End: 2023-06-20
Payer: COMMERCIAL

## 2023-06-20 ENCOUNTER — ASOB RESULT (OUTPATIENT)
Age: 33
End: 2023-06-20

## 2023-06-20 ENCOUNTER — NON-APPOINTMENT (OUTPATIENT)
Age: 33
End: 2023-06-20

## 2023-06-20 PROCEDURE — 76811 OB US DETAILED SNGL FETUS: CPT

## 2023-07-12 ENCOUNTER — APPOINTMENT (OUTPATIENT)
Dept: OBGYN | Facility: CLINIC | Age: 33
End: 2023-07-12

## 2023-07-19 ENCOUNTER — APPOINTMENT (OUTPATIENT)
Dept: OBGYN | Facility: CLINIC | Age: 33
End: 2023-07-19
Payer: COMMERCIAL

## 2023-07-19 VITALS
HEIGHT: 60 IN | DIASTOLIC BLOOD PRESSURE: 69 MMHG | SYSTOLIC BLOOD PRESSURE: 106 MMHG | BODY MASS INDEX: 23.95 KG/M2 | WEIGHT: 122 LBS

## 2023-07-19 DIAGNOSIS — Z34.82 ENCOUNTER FOR SUPERVISION OF OTHER NORMAL PREGNANCY, SECOND TRIMESTER: ICD-10-CM

## 2023-07-19 PROCEDURE — 99212 OFFICE O/P EST SF 10 MIN: CPT

## 2023-07-25 ENCOUNTER — APPOINTMENT (OUTPATIENT)
Dept: ANTEPARTUM | Facility: CLINIC | Age: 33
End: 2023-07-25
Payer: COMMERCIAL

## 2023-07-25 ENCOUNTER — ASOB RESULT (OUTPATIENT)
Age: 33
End: 2023-07-25

## 2023-07-25 PROCEDURE — 76816 OB US FOLLOW-UP PER FETUS: CPT

## 2023-08-14 ENCOUNTER — APPOINTMENT (OUTPATIENT)
Dept: OBGYN | Facility: CLINIC | Age: 33
End: 2023-08-14
Payer: COMMERCIAL

## 2023-08-14 VITALS — DIASTOLIC BLOOD PRESSURE: 68 MMHG | HEART RATE: 92 BPM | SYSTOLIC BLOOD PRESSURE: 106 MMHG

## 2023-08-14 PROCEDURE — 99212 OFFICE O/P EST SF 10 MIN: CPT

## 2023-08-16 LAB
GLUCOSE 1H P 50 G GLC PO SERPL-MCNC: 81 MG/DL
T PALLIDUM AB SER QL IA: NEGATIVE

## 2023-08-16 RX ORDER — MULTIVIT-MIN/IRON/FOLIC ACID/K 18-600-40
500 CAPSULE ORAL DAILY
Qty: 1 | Refills: 3 | Status: ACTIVE | COMMUNITY
Start: 2023-08-16 | End: 1900-01-01

## 2023-08-30 ENCOUNTER — APPOINTMENT (OUTPATIENT)
Dept: OBGYN | Facility: CLINIC | Age: 33
End: 2023-08-30
Payer: COMMERCIAL

## 2023-08-30 VITALS
DIASTOLIC BLOOD PRESSURE: 76 MMHG | SYSTOLIC BLOOD PRESSURE: 108 MMHG | WEIGHT: 123 LBS | HEIGHT: 60 IN | HEART RATE: 80 BPM | BODY MASS INDEX: 24.15 KG/M2

## 2023-08-30 PROCEDURE — 99212 OFFICE O/P EST SF 10 MIN: CPT

## 2023-08-30 RX ORDER — LEVOTHYROXINE SODIUM 75 UG/1
75 CAPSULE ORAL DAILY
Qty: 30 | Refills: 3 | Status: ACTIVE | COMMUNITY
Start: 2023-08-30 | End: 1900-01-01

## 2023-09-14 ENCOUNTER — NON-APPOINTMENT (OUTPATIENT)
Age: 33
End: 2023-09-14

## 2023-09-14 ENCOUNTER — APPOINTMENT (OUTPATIENT)
Dept: ANTEPARTUM | Facility: CLINIC | Age: 33
End: 2023-09-14
Payer: COMMERCIAL

## 2023-09-14 ENCOUNTER — APPOINTMENT (OUTPATIENT)
Dept: OBGYN | Facility: CLINIC | Age: 33
End: 2023-09-14
Payer: COMMERCIAL

## 2023-09-14 ENCOUNTER — ASOB RESULT (OUTPATIENT)
Age: 33
End: 2023-09-14

## 2023-09-14 VITALS
WEIGHT: 125 LBS | BODY MASS INDEX: 24.54 KG/M2 | HEART RATE: 84 BPM | HEIGHT: 60 IN | DIASTOLIC BLOOD PRESSURE: 70 MMHG | SYSTOLIC BLOOD PRESSURE: 103 MMHG

## 2023-09-14 PROCEDURE — 76816 OB US FOLLOW-UP PER FETUS: CPT

## 2023-09-14 PROCEDURE — 99212 OFFICE O/P EST SF 10 MIN: CPT

## 2023-09-14 PROCEDURE — 76819 FETAL BIOPHYS PROFIL W/O NST: CPT | Mod: 59

## 2023-09-15 ENCOUNTER — TRANSCRIPTION ENCOUNTER (OUTPATIENT)
Age: 33
End: 2023-09-15

## 2023-09-15 LAB
BASOPHILS # BLD AUTO: 0.04 K/UL
BASOPHILS NFR BLD AUTO: 0.6 %
EOSINOPHIL # BLD AUTO: 0.07 K/UL
EOSINOPHIL NFR BLD AUTO: 1 %
HCT VFR BLD CALC: 33.3 %
HGB BLD-MCNC: 10.9 G/DL
IMM GRANULOCYTES NFR BLD AUTO: 0.3 %
LYMPHOCYTES # BLD AUTO: 1.79 K/UL
LYMPHOCYTES NFR BLD AUTO: 24.7 %
MAN DIFF?: NORMAL
MCHC RBC-ENTMCNC: 30.3 PG
MCHC RBC-ENTMCNC: 32.7 GM/DL
MCV RBC AUTO: 92.5 FL
MONOCYTES # BLD AUTO: 0.33 K/UL
MONOCYTES NFR BLD AUTO: 4.6 %
NEUTROPHILS # BLD AUTO: 5 K/UL
NEUTROPHILS NFR BLD AUTO: 68.8 %
PLATELET # BLD AUTO: 196 K/UL
RBC # BLD: 3.6 M/UL
RBC # FLD: 16.7 %
TSH SERPL-ACNC: 1.51 UIU/ML
WBC # FLD AUTO: 7.25 K/UL

## 2023-09-26 ENCOUNTER — APPOINTMENT (OUTPATIENT)
Dept: OBGYN | Facility: CLINIC | Age: 33
End: 2023-09-26
Payer: COMMERCIAL

## 2023-09-26 VITALS
SYSTOLIC BLOOD PRESSURE: 106 MMHG | HEIGHT: 60 IN | DIASTOLIC BLOOD PRESSURE: 68 MMHG | HEART RATE: 97 BPM | BODY MASS INDEX: 24.94 KG/M2 | WEIGHT: 127 LBS

## 2023-09-26 PROCEDURE — 90715 TDAP VACCINE 7 YRS/> IM: CPT

## 2023-09-26 PROCEDURE — 90471 IMMUNIZATION ADMIN: CPT

## 2023-09-26 PROCEDURE — 99212 OFFICE O/P EST SF 10 MIN: CPT | Mod: 25

## 2023-10-04 ENCOUNTER — RX RENEWAL (OUTPATIENT)
Age: 33
End: 2023-10-04

## 2023-10-12 ENCOUNTER — APPOINTMENT (OUTPATIENT)
Dept: OBGYN | Facility: CLINIC | Age: 33
End: 2023-10-12

## 2023-10-18 ENCOUNTER — APPOINTMENT (OUTPATIENT)
Dept: OBGYN | Facility: CLINIC | Age: 33
End: 2023-10-18
Payer: COMMERCIAL

## 2023-10-18 VITALS
HEART RATE: 99 BPM | HEIGHT: 60 IN | BODY MASS INDEX: 25.13 KG/M2 | DIASTOLIC BLOOD PRESSURE: 75 MMHG | WEIGHT: 128 LBS | SYSTOLIC BLOOD PRESSURE: 112 MMHG

## 2023-10-18 PROCEDURE — 99212 OFFICE O/P EST SF 10 MIN: CPT

## 2023-10-19 LAB
HIV1+2 AB SPEC QL IA.RAPID: NONREACTIVE
TSH SERPL-ACNC: 2.18 UIU/ML

## 2023-10-20 LAB
GP B STREP DNA SPEC QL NAA+PROBE: DETECTED
SOURCE GBS: NORMAL

## 2023-10-25 ENCOUNTER — APPOINTMENT (OUTPATIENT)
Dept: OBGYN | Facility: CLINIC | Age: 33
End: 2023-10-25
Payer: COMMERCIAL

## 2023-10-25 VITALS
SYSTOLIC BLOOD PRESSURE: 118 MMHG | WEIGHT: 128 LBS | HEIGHT: 60 IN | BODY MASS INDEX: 25.13 KG/M2 | HEART RATE: 83 BPM | DIASTOLIC BLOOD PRESSURE: 80 MMHG

## 2023-10-25 PROCEDURE — 99213 OFFICE O/P EST LOW 20 MIN: CPT

## 2023-10-27 ENCOUNTER — NON-APPOINTMENT (OUTPATIENT)
Age: 33
End: 2023-10-27

## 2023-10-27 ENCOUNTER — OUTPATIENT (OUTPATIENT)
Dept: OUTPATIENT SERVICES | Facility: HOSPITAL | Age: 33
LOS: 1 days | End: 2023-10-27

## 2023-10-27 VITALS
RESPIRATION RATE: 16 BRPM | WEIGHT: 128.97 LBS | HEIGHT: 60 IN | TEMPERATURE: 98 F | OXYGEN SATURATION: 99 % | HEART RATE: 86 BPM | SYSTOLIC BLOOD PRESSURE: 113 MMHG | DIASTOLIC BLOOD PRESSURE: 78 MMHG

## 2023-10-27 DIAGNOSIS — Z34.83 ENCOUNTER FOR SUPERVISION OF OTHER NORMAL PREGNANCY, THIRD TRIMESTER: ICD-10-CM

## 2023-10-27 DIAGNOSIS — Z34.90 ENCOUNTER FOR SUPERVISION OF NORMAL PREGNANCY, UNSPECIFIED, UNSPECIFIED TRIMESTER: ICD-10-CM

## 2023-10-27 DIAGNOSIS — Z98.890 OTHER SPECIFIED POSTPROCEDURAL STATES: Chronic | ICD-10-CM

## 2023-10-27 DIAGNOSIS — E03.9 HYPOTHYROIDISM, UNSPECIFIED: ICD-10-CM

## 2023-10-27 DIAGNOSIS — Z98.891 HISTORY OF UTERINE SCAR FROM PREVIOUS SURGERY: Chronic | ICD-10-CM

## 2023-10-27 LAB
APPEARANCE UR: CLEAR — SIGNIFICANT CHANGE UP
APPEARANCE UR: CLEAR — SIGNIFICANT CHANGE UP
BILIRUB UR-MCNC: NEGATIVE — SIGNIFICANT CHANGE UP
BILIRUB UR-MCNC: NEGATIVE — SIGNIFICANT CHANGE UP
BLD GP AB SCN SERPL QL: NEGATIVE — SIGNIFICANT CHANGE UP
BLD GP AB SCN SERPL QL: NEGATIVE — SIGNIFICANT CHANGE UP
COLOR SPEC: YELLOW — SIGNIFICANT CHANGE UP
COLOR SPEC: YELLOW — SIGNIFICANT CHANGE UP
DIFF PNL FLD: NEGATIVE — SIGNIFICANT CHANGE UP
DIFF PNL FLD: NEGATIVE — SIGNIFICANT CHANGE UP
GLUCOSE UR QL: NEGATIVE MG/DL — SIGNIFICANT CHANGE UP
GLUCOSE UR QL: NEGATIVE MG/DL — SIGNIFICANT CHANGE UP
HCT VFR BLD CALC: 35.6 % — SIGNIFICANT CHANGE UP (ref 34.5–45)
HCT VFR BLD CALC: 35.6 % — SIGNIFICANT CHANGE UP (ref 34.5–45)
HGB BLD-MCNC: 12.1 G/DL — SIGNIFICANT CHANGE UP (ref 11.5–15.5)
HGB BLD-MCNC: 12.1 G/DL — SIGNIFICANT CHANGE UP (ref 11.5–15.5)
KETONES UR-MCNC: NEGATIVE MG/DL — SIGNIFICANT CHANGE UP
KETONES UR-MCNC: NEGATIVE MG/DL — SIGNIFICANT CHANGE UP
LEUKOCYTE ESTERASE UR-ACNC: NEGATIVE — SIGNIFICANT CHANGE UP
LEUKOCYTE ESTERASE UR-ACNC: NEGATIVE — SIGNIFICANT CHANGE UP
MCHC RBC-ENTMCNC: 29.9 PG — SIGNIFICANT CHANGE UP (ref 27–34)
MCHC RBC-ENTMCNC: 29.9 PG — SIGNIFICANT CHANGE UP (ref 27–34)
MCHC RBC-ENTMCNC: 34 GM/DL — SIGNIFICANT CHANGE UP (ref 32–36)
MCHC RBC-ENTMCNC: 34 GM/DL — SIGNIFICANT CHANGE UP (ref 32–36)
MCV RBC AUTO: 87.9 FL — SIGNIFICANT CHANGE UP (ref 80–100)
MCV RBC AUTO: 87.9 FL — SIGNIFICANT CHANGE UP (ref 80–100)
NITRITE UR-MCNC: NEGATIVE — SIGNIFICANT CHANGE UP
NITRITE UR-MCNC: NEGATIVE — SIGNIFICANT CHANGE UP
NRBC # BLD: 0 /100 WBCS — SIGNIFICANT CHANGE UP (ref 0–0)
NRBC # BLD: 0 /100 WBCS — SIGNIFICANT CHANGE UP (ref 0–0)
NRBC # FLD: 0 K/UL — SIGNIFICANT CHANGE UP (ref 0–0)
NRBC # FLD: 0 K/UL — SIGNIFICANT CHANGE UP (ref 0–0)
PH UR: 7 — SIGNIFICANT CHANGE UP (ref 5–8)
PH UR: 7 — SIGNIFICANT CHANGE UP (ref 5–8)
PLATELET # BLD AUTO: 201 K/UL — SIGNIFICANT CHANGE UP (ref 150–400)
PLATELET # BLD AUTO: 201 K/UL — SIGNIFICANT CHANGE UP (ref 150–400)
PROT UR-MCNC: NEGATIVE MG/DL — SIGNIFICANT CHANGE UP
PROT UR-MCNC: NEGATIVE MG/DL — SIGNIFICANT CHANGE UP
RBC # BLD: 4.05 M/UL — SIGNIFICANT CHANGE UP (ref 3.8–5.2)
RBC # BLD: 4.05 M/UL — SIGNIFICANT CHANGE UP (ref 3.8–5.2)
RBC # FLD: 15.2 % — HIGH (ref 10.3–14.5)
RBC # FLD: 15.2 % — HIGH (ref 10.3–14.5)
RH IG SCN BLD-IMP: POSITIVE — SIGNIFICANT CHANGE UP
RH IG SCN BLD-IMP: POSITIVE — SIGNIFICANT CHANGE UP
SP GR SPEC: 1.01 — SIGNIFICANT CHANGE UP (ref 1–1.03)
SP GR SPEC: 1.01 — SIGNIFICANT CHANGE UP (ref 1–1.03)
UROBILINOGEN FLD QL: 0.2 MG/DL — SIGNIFICANT CHANGE UP (ref 0.2–1)
UROBILINOGEN FLD QL: 0.2 MG/DL — SIGNIFICANT CHANGE UP (ref 0.2–1)
WBC # BLD: 9.36 K/UL — SIGNIFICANT CHANGE UP (ref 3.8–10.5)
WBC # BLD: 9.36 K/UL — SIGNIFICANT CHANGE UP (ref 3.8–10.5)
WBC # FLD AUTO: 9.36 K/UL — SIGNIFICANT CHANGE UP (ref 3.8–10.5)
WBC # FLD AUTO: 9.36 K/UL — SIGNIFICANT CHANGE UP (ref 3.8–10.5)

## 2023-10-27 RX ORDER — METFORMIN HYDROCHLORIDE 850 MG/1
1 TABLET ORAL
Qty: 0 | Refills: 0 | DISCHARGE

## 2023-10-27 NOTE — OB PST NOTE - NSHPREVIEWOFSYSTEMS_GEN_ALL_CORE
General: No fever, chills, sweating, anorexia, weight loss or weight gain. No polyphagia, polyurea, polydypsia, malaise, or fatigue    Skin: No rashes, itching, or dryness. No change in size/color of moles. No tumors, brittle nails, pitted nails, or hair loss    Breast: No tenderness, lumps, or nipple discharge      Ophthalmologic: No diplopia, photophobia, lacrimation, blurred Vision , or eye discharge    ENMT Symptoms: nasal   discharge .  No hearing difficulty, ear pain, tinnitus, or vertigo. No sinus symptoms, nasal congestion, or nasal obstruction    Respiratory and Thorax: No wheezing, dyspnea, cough, hemoptysis, or pleuritic chest pPain     Cardiovascular: No chest pain, palpitations, dyspnea on exertion, orthopnea, paroxysmal nocturnal dyspnea,   peripheral edema, or claudication    Gastrointestinal: No nausea, vomiting, diarrhea, constipation, change in bowel habits, flatulence, abdominal pain, or melena    Genitourinary/ Pelvis: No hematuria, renal colic, or flank pain.  No urine discoloration, incontinence, dysuria, or urinary hesitancy. Normal urinary frequency. No nocturia, abnormal vaginal bleeding, vaginal discharge, spotting, pelvic pain, or vaginal leakage    Musculoskeletal: No arthralgia, arthritis, joint swelling, muscle cramping, muscle weakness, neck pain, arm pain, or leg pain    Neurological: No transient paralysis, weakness, paresthesias, or seizures. No syncope, tremors, vertigo, loss of sensation, difficulty walking, loss of consciousness, hemiparesis, confusion, or facial palsy    Psychiatric: No suicidal ideation, depression, anxiety, insomnia, memory loss, paranoia, mood swings, agitation, hallucinations, or hyperactivity    Hematology: No gum bleeding, nose bleeding, or skin lumps    Lymphatic: No enlarged or tender lymph nodes. No extremity swelling    Endocrine: No heat or cold intolerance    Immunologic: No recurrent or persistent infections

## 2023-10-27 NOTE — OB PST NOTE - PROBLEM SELECTOR PLAN 1
scheduled for -section on 2023 with Dr. Joel.   Verbal and written pre-op instructions provided to patient. Patient verbalized understanding and will call surgeons office for revised instructions if surgery is rescheduled.   Preoperative instructions reviewed. No food 8 hours prior to , clear liquids up to 2.5 hours prior to procedure. Advised to drink regular Gatorade prior to admission. Pending labs Type & screen, CBC, UA. Last dose of PNV 7 days prior to surgery.   patient given verbal and written instruction with teach back on chlorhexidine shampoo, and the patient verbalized understanding with return demonstration.

## 2023-10-27 NOTE — OB PST NOTE - FALL HARM RISK - UNIVERSAL INTERVENTIONS
Bed in lowest position, wheels locked, appropriate side rails in place/Call bell, personal items and telephone in reach/Instruct patient to call for assistance before getting out of bed or chair/Non-slip footwear when patient is out of bed/[place] to call system/Physically safe environment - no spills, clutter or unnecessary equipment/Purposeful Proactive Rounding/Room/bathroom lighting operational, light cord in reach

## 2023-10-27 NOTE — OB PST NOTE - HISTORY OF PRESENT ILLNESS
34 yo female presents to PST unit  scheduled for -section on 2023 with Dr. Joel. ISAÍAS 2023. She reports positive fetal movement, denies loss of fluid, vaginal bleeding or painful uterine contractions.

## 2023-10-27 NOTE — OB PST NOTE - NSANTHOSAYNRD_GEN_A_CORE
No. MARÍA ELENA screening performed.  STOP BANG Legend: 0-2 = LOW Risk; 3-4 = INTERMEDIATE Risk; 5-8 = HIGH Risk

## 2023-10-31 ENCOUNTER — OUTPATIENT (OUTPATIENT)
Dept: OUTPATIENT SERVICES | Facility: HOSPITAL | Age: 33
LOS: 1 days | Discharge: HOME | End: 2023-10-31
Payer: COMMERCIAL

## 2023-10-31 ENCOUNTER — ASOB RESULT (OUTPATIENT)
Age: 33
End: 2023-10-31

## 2023-10-31 ENCOUNTER — APPOINTMENT (OUTPATIENT)
Dept: ANTEPARTUM | Facility: CLINIC | Age: 33
End: 2023-10-31
Payer: COMMERCIAL

## 2023-10-31 VITALS
TEMPERATURE: 98 F | HEART RATE: 74 BPM | SYSTOLIC BLOOD PRESSURE: 113 MMHG | DIASTOLIC BLOOD PRESSURE: 70 MMHG | RESPIRATION RATE: 17 BRPM

## 2023-10-31 VITALS — DIASTOLIC BLOOD PRESSURE: 77 MMHG | HEART RATE: 82 BPM | SYSTOLIC BLOOD PRESSURE: 119 MMHG

## 2023-10-31 DIAGNOSIS — Z98.891 HISTORY OF UTERINE SCAR FROM PREVIOUS SURGERY: Chronic | ICD-10-CM

## 2023-10-31 DIAGNOSIS — O26.899 OTHER SPECIFIED PREGNANCY RELATED CONDITIONS, UNSPECIFIED TRIMESTER: ICD-10-CM

## 2023-10-31 DIAGNOSIS — Z98.890 OTHER SPECIFIED POSTPROCEDURAL STATES: Chronic | ICD-10-CM

## 2023-10-31 PROCEDURE — 99212 OFFICE O/P EST SF 10 MIN: CPT | Mod: 25

## 2023-10-31 PROCEDURE — 76819 FETAL BIOPHYS PROFIL W/O NST: CPT | Mod: 26

## 2023-10-31 PROCEDURE — 83986 ASSAY PH BODY FLUID NOS: CPT | Mod: QW

## 2023-10-31 NOTE — OB PROVIDER TRIAGE NOTE - NS_OBGYNHISTORY_OBGYN_ALL_OB_FT
OB HX:   2012 SAB x's 1 incomplete    ETOP x's 1  8/10/2018 primary  @ 35.4 wks gestation / TIUP / 4#8, 4#9

## 2023-10-31 NOTE — OB PROVIDER TRIAGE NOTE - NSHPPHYSICALEXAM_GEN_ALL_CORE
abdomen: soft, nt on palp  SSE:  no evidence of pooling  fern- neg  nitrazine- neg   small amount cervical mucous noted   SVE:  internal os closed   external os 0.5/50/-3  NST in progress  T(C): 36.7 (10-31-23 @ 13:16), Max: 36.7 (10-31-23 @ 13:16)  HR: 74 (10-31-23 @ 13:28) (74 - 74)  BP: 113/70 (10-31-23 @ 13:28) (113/70 - 113/70)  RR: 17 (10-31-23 @ 13:16) (17 - 17)  SpO2: -- abdomen: soft, nt on palp  SSE:  no evidence of pooling  fern- neg  nitrazine- neg   small amount cervical mucous noted   SVE:  internal os closed   external os 0.5/50/-3  NST in progress  T(C): 36.7 (10-31-23 @ 13:16), Max: 36.7 (10-31-23 @ 13:16)  HR: 74 (10-31-23 @ 13:28) (74 - 74)  BP: 113/70 (10-31-23 @ 13:28) (113/70 - 113/70)  RR: 17 (10-31-23 @ 13:16) (17 - 17)  SpO2: --    NST reactive  FH baseline 120 FH variability mod +FH accels no FH decels   toco: irregular     sono images in ASOB  sono report in ASOB  TAS: BPP: 8/8 vtx TRISH 15.27 posterior placenta

## 2023-10-31 NOTE — OB PROVIDER TRIAGE NOTE - ADDITIONAL INSTRUCTIONS
34 y/o @ 39.5 wks gestation r/o SROM / labor desires a TOLAC:  no evidence of SROM  no evidence of active labor   maternal and fetal status reassuring  plan of care d/w dr govea  discharge home   labor precautions d/w pt  increase fluid intake  instructed on fetal kickcounts   pt to follow up with dr gibbs as sched 2023  pt sched for repeat  if no labor 11/3/2023  w/v discharge instructions given  discharged home

## 2023-10-31 NOTE — OB PROVIDER TRIAGE NOTE - HISTORY OF PRESENT ILLNESS
34 y/o  @ 39.5 wks gestation presents with c/o ?SROM @ 1030, states was sitting on toilet and felt like she voided a large amount and c/o uterine contractions every 5 minutes since last evening states her pain is 8/10 on pain scale and desires no pain meds at tis time denies any VB reports +FM denies any n/v/d denies any fever or chills ap care comp by :  GBS- +   desires a TOLAC

## 2023-10-31 NOTE — OB PROVIDER TRIAGE NOTE - NSOBPROVIDERNOTE_OBGYN_ALL_OB_FT
32 y/o @ 39.5 wks gestation r/o SROM / labor desires a TOLAC: 34 y/o @ 39.5 wks gestation r/o SROM / labor desires a TOLAC:  no evidence of SROM  no evidence of active labor   maternal and fetal status reassuring  plan of care d/w dr govea  discharge home   labor precautions d/w pt  increase fluid intake  instructed on fetal kickcounts   pt to follow up with dr gibbs as sched 2023  pt sched for repeat  if no labor 11/3/2023  w/v discharge instructions given  discharged home

## 2023-11-01 ENCOUNTER — TRANSCRIPTION ENCOUNTER (OUTPATIENT)
Age: 33
End: 2023-11-01

## 2023-11-01 ENCOUNTER — APPOINTMENT (OUTPATIENT)
Dept: OBGYN | Facility: CLINIC | Age: 33
End: 2023-11-01

## 2023-11-01 DIAGNOSIS — O99.283 ENDOCRINE, NUTRITIONAL AND METABOLIC DISEASES COMPLICATING PREGNANCY, THIRD TRIMESTER: ICD-10-CM

## 2023-11-01 DIAGNOSIS — E03.9 HYPOTHYROIDISM, UNSPECIFIED: ICD-10-CM

## 2023-11-01 DIAGNOSIS — O47.1 FALSE LABOR AT OR AFTER 37 COMPLETED WEEKS OF GESTATION: ICD-10-CM

## 2023-11-01 DIAGNOSIS — Z3A.39 39 WEEKS GESTATION OF PREGNANCY: ICD-10-CM

## 2023-11-01 DIAGNOSIS — Z03.71 ENCOUNTER FOR SUSPECTED PROBLEM WITH AMNIOTIC CAVITY AND MEMBRANE RULED OUT: ICD-10-CM

## 2023-11-02 ENCOUNTER — INPATIENT (INPATIENT)
Facility: HOSPITAL | Age: 33
LOS: 1 days | Discharge: HOME | End: 2023-11-04
Attending: STUDENT IN AN ORGANIZED HEALTH CARE EDUCATION/TRAINING PROGRAM | Admitting: STUDENT IN AN ORGANIZED HEALTH CARE EDUCATION/TRAINING PROGRAM
Payer: MEDICAID

## 2023-11-02 ENCOUNTER — APPOINTMENT (OUTPATIENT)
Dept: OBGYN | Facility: CLINIC | Age: 33
End: 2023-11-02
Payer: MEDICAID

## 2023-11-02 VITALS
SYSTOLIC BLOOD PRESSURE: 117 MMHG | HEART RATE: 85 BPM | DIASTOLIC BLOOD PRESSURE: 70 MMHG | WEIGHT: 130 LBS | HEIGHT: 60 IN | BODY MASS INDEX: 25.52 KG/M2

## 2023-11-02 VITALS
SYSTOLIC BLOOD PRESSURE: 108 MMHG | TEMPERATURE: 98 F | HEART RATE: 76 BPM | RESPIRATION RATE: 16 BRPM | DIASTOLIC BLOOD PRESSURE: 74 MMHG

## 2023-11-02 DIAGNOSIS — O42.90 PREMATURE RUPTURE OF MEMBRANES, UNSPECIFIED AS TO LENGTH OF TIME BETWEEN RUPTURE AND ONSET OF LABOR, UNSPECIFIED WEEKS OF GESTATION: ICD-10-CM

## 2023-11-02 DIAGNOSIS — Z34.83 ENCOUNTER FOR SUPERVISION OF OTHER NORMAL PREGNANCY, THIRD TRIMESTER: ICD-10-CM

## 2023-11-02 DIAGNOSIS — O26.899 OTHER SPECIFIED PREGNANCY RELATED CONDITIONS, UNSPECIFIED TRIMESTER: ICD-10-CM

## 2023-11-02 DIAGNOSIS — Z98.891 HISTORY OF UTERINE SCAR FROM PREVIOUS SURGERY: Chronic | ICD-10-CM

## 2023-11-02 DIAGNOSIS — Z98.890 OTHER SPECIFIED POSTPROCEDURAL STATES: Chronic | ICD-10-CM

## 2023-11-02 LAB
BASOPHILS # BLD AUTO: 0.03 K/UL — SIGNIFICANT CHANGE UP (ref 0–0.2)
BASOPHILS # BLD AUTO: 0.03 K/UL — SIGNIFICANT CHANGE UP (ref 0–0.2)
BASOPHILS NFR BLD AUTO: 0.3 % — SIGNIFICANT CHANGE UP (ref 0–2)
BASOPHILS NFR BLD AUTO: 0.3 % — SIGNIFICANT CHANGE UP (ref 0–2)
EOSINOPHIL # BLD AUTO: 0.06 K/UL — SIGNIFICANT CHANGE UP (ref 0–0.5)
EOSINOPHIL # BLD AUTO: 0.06 K/UL — SIGNIFICANT CHANGE UP (ref 0–0.5)
EOSINOPHIL NFR BLD AUTO: 0.6 % — SIGNIFICANT CHANGE UP (ref 0–6)
EOSINOPHIL NFR BLD AUTO: 0.6 % — SIGNIFICANT CHANGE UP (ref 0–6)
HCT VFR BLD CALC: 35 % — SIGNIFICANT CHANGE UP (ref 34.5–45)
HCT VFR BLD CALC: 35 % — SIGNIFICANT CHANGE UP (ref 34.5–45)
HGB BLD-MCNC: 12.3 G/DL — SIGNIFICANT CHANGE UP (ref 11.5–15.5)
HGB BLD-MCNC: 12.3 G/DL — SIGNIFICANT CHANGE UP (ref 11.5–15.5)
IANC: 7.29 K/UL — SIGNIFICANT CHANGE UP (ref 1.8–7.4)
IANC: 7.29 K/UL — SIGNIFICANT CHANGE UP (ref 1.8–7.4)
IMM GRANULOCYTES NFR BLD AUTO: 0.4 % — SIGNIFICANT CHANGE UP (ref 0–0.9)
IMM GRANULOCYTES NFR BLD AUTO: 0.4 % — SIGNIFICANT CHANGE UP (ref 0–0.9)
LYMPHOCYTES # BLD AUTO: 2.71 K/UL — SIGNIFICANT CHANGE UP (ref 1–3.3)
LYMPHOCYTES # BLD AUTO: 2.71 K/UL — SIGNIFICANT CHANGE UP (ref 1–3.3)
LYMPHOCYTES # BLD AUTO: 25.5 % — SIGNIFICANT CHANGE UP (ref 13–44)
LYMPHOCYTES # BLD AUTO: 25.5 % — SIGNIFICANT CHANGE UP (ref 13–44)
MCHC RBC-ENTMCNC: 30.1 PG — SIGNIFICANT CHANGE UP (ref 27–34)
MCHC RBC-ENTMCNC: 30.1 PG — SIGNIFICANT CHANGE UP (ref 27–34)
MCHC RBC-ENTMCNC: 35.1 GM/DL — SIGNIFICANT CHANGE UP (ref 32–36)
MCHC RBC-ENTMCNC: 35.1 GM/DL — SIGNIFICANT CHANGE UP (ref 32–36)
MCV RBC AUTO: 85.8 FL — SIGNIFICANT CHANGE UP (ref 80–100)
MCV RBC AUTO: 85.8 FL — SIGNIFICANT CHANGE UP (ref 80–100)
MONOCYTES # BLD AUTO: 0.49 K/UL — SIGNIFICANT CHANGE UP (ref 0–0.9)
MONOCYTES # BLD AUTO: 0.49 K/UL — SIGNIFICANT CHANGE UP (ref 0–0.9)
MONOCYTES NFR BLD AUTO: 4.6 % — SIGNIFICANT CHANGE UP (ref 2–14)
MONOCYTES NFR BLD AUTO: 4.6 % — SIGNIFICANT CHANGE UP (ref 2–14)
NEUTROPHILS # BLD AUTO: 7.29 K/UL — SIGNIFICANT CHANGE UP (ref 1.8–7.4)
NEUTROPHILS # BLD AUTO: 7.29 K/UL — SIGNIFICANT CHANGE UP (ref 1.8–7.4)
NEUTROPHILS NFR BLD AUTO: 68.6 % — SIGNIFICANT CHANGE UP (ref 43–77)
NEUTROPHILS NFR BLD AUTO: 68.6 % — SIGNIFICANT CHANGE UP (ref 43–77)
NRBC # BLD: 0 /100 WBCS — SIGNIFICANT CHANGE UP (ref 0–0)
NRBC # BLD: 0 /100 WBCS — SIGNIFICANT CHANGE UP (ref 0–0)
NRBC # FLD: 0 K/UL — SIGNIFICANT CHANGE UP (ref 0–0)
NRBC # FLD: 0 K/UL — SIGNIFICANT CHANGE UP (ref 0–0)
PLATELET # BLD AUTO: 193 K/UL — SIGNIFICANT CHANGE UP (ref 150–400)
PLATELET # BLD AUTO: 193 K/UL — SIGNIFICANT CHANGE UP (ref 150–400)
RBC # BLD: 4.08 M/UL — SIGNIFICANT CHANGE UP (ref 3.8–5.2)
RBC # BLD: 4.08 M/UL — SIGNIFICANT CHANGE UP (ref 3.8–5.2)
RBC # FLD: 15 % — HIGH (ref 10.3–14.5)
RBC # FLD: 15 % — HIGH (ref 10.3–14.5)
WBC # BLD: 10.62 K/UL — HIGH (ref 3.8–10.5)
WBC # BLD: 10.62 K/UL — HIGH (ref 3.8–10.5)
WBC # FLD AUTO: 10.62 K/UL — HIGH (ref 3.8–10.5)
WBC # FLD AUTO: 10.62 K/UL — HIGH (ref 3.8–10.5)

## 2023-11-02 PROCEDURE — 99212 OFFICE O/P EST SF 10 MIN: CPT

## 2023-11-02 RX ORDER — AMPICILLIN TRIHYDRATE 250 MG
2 CAPSULE ORAL ONCE
Refills: 0 | Status: COMPLETED | OUTPATIENT
Start: 2023-11-02 | End: 2023-11-02

## 2023-11-02 RX ORDER — AMPICILLIN TRIHYDRATE 250 MG
1 CAPSULE ORAL EVERY 4 HOURS
Refills: 0 | Status: DISCONTINUED | OUTPATIENT
Start: 2023-11-02 | End: 2023-11-03

## 2023-11-02 RX ORDER — CHLORHEXIDINE GLUCONATE 213 G/1000ML
1 SOLUTION TOPICAL DAILY
Refills: 0 | Status: DISCONTINUED | OUTPATIENT
Start: 2023-11-02 | End: 2023-11-03

## 2023-11-02 RX ORDER — SODIUM CHLORIDE 9 MG/ML
1000 INJECTION, SOLUTION INTRAVENOUS
Refills: 0 | Status: DISCONTINUED | OUTPATIENT
Start: 2023-11-02 | End: 2023-11-03

## 2023-11-02 RX ORDER — OXYTOCIN 10 UNIT/ML
333.33 VIAL (ML) INJECTION
Qty: 20 | Refills: 0 | Status: DISCONTINUED | OUTPATIENT
Start: 2023-11-02 | End: 2023-11-03

## 2023-11-02 NOTE — OB PROVIDER H&P - NSHPPHYSICALEXAM_GEN_ALL_CORE
ICU Vital Signs Last 24 Hrs  T(C): 36.8 (02 Nov 2023 20:38), Max: 36.8 (02 Nov 2023 20:38)  T(F): 98.2 (02 Nov 2023 20:38), Max: 98.2 (02 Nov 2023 20:38)  HR: 75 (02 Nov 2023 20:57) (75 - 76)  BP: 114/77 (02 Nov 2023 20:57) (108/74 - 114/77)  BP(mean): --  ABP: --  ABP(mean): --  RR: 16 (02 Nov 2023 20:38) (16 - 16)  SpO2: --    Abdomen soft nontender  Speculum: Positive nitrazine and fern   SVE: 3/70/-3  EFW: 3000gms by leopolds    GBS: Pos. (10/18/23)  Category I/Reactive NST  Leonor every 3-7mins

## 2023-11-02 NOTE — OB PROVIDER H&P - NSPRENATALCARE_OBGYN_ALL_OB
Maryam: are you sending patient something? Or would you like us to call her and have her stop in?    Let me know if you need anything from me otherwise can you please contact pt  Thank you!  Annalisa WEISS RN     Yes

## 2023-11-02 NOTE — OB PROVIDER H&P - HISTORY OF PRESENT ILLNESS
Pt. is a 32y/o  EGA 40wks reports of leakage of fluid since 1930 and abdominal cramping. Pt. denies vaginal bleeding. Pt. reports fetal movement. Pt. is scheduled for repeat  , but desires to TOLAC.

## 2023-11-02 NOTE — OB PROVIDER H&P - ASSESSMENT
Evidence of rupture of membranes, discussed findings with Dr. Fernandez  -Admit to L&D   -Routine labs ordered   -Ampicillin for GBS  -Two units PRBCs on hold   -For expectant management

## 2023-11-03 ENCOUNTER — APPOINTMENT (OUTPATIENT)
Dept: OBGYN | Facility: HOSPITAL | Age: 33
End: 2023-11-03

## 2023-11-03 ENCOUNTER — TRANSCRIPTION ENCOUNTER (OUTPATIENT)
Age: 33
End: 2023-11-03

## 2023-11-03 LAB
BLD GP AB SCN SERPL QL: NEGATIVE — SIGNIFICANT CHANGE UP
BLD GP AB SCN SERPL QL: NEGATIVE — SIGNIFICANT CHANGE UP
RH IG SCN BLD-IMP: POSITIVE — SIGNIFICANT CHANGE UP
RH IG SCN BLD-IMP: POSITIVE — SIGNIFICANT CHANGE UP
T PALLIDUM AB TITR SER: NEGATIVE — SIGNIFICANT CHANGE UP
T PALLIDUM AB TITR SER: NEGATIVE — SIGNIFICANT CHANGE UP

## 2023-11-03 PROCEDURE — 59409 OBSTETRICAL CARE: CPT | Mod: U9,GC

## 2023-11-03 RX ORDER — MAGNESIUM HYDROXIDE 400 MG/1
30 TABLET, CHEWABLE ORAL
Refills: 0 | Status: DISCONTINUED | OUTPATIENT
Start: 2023-11-03 | End: 2023-11-04

## 2023-11-03 RX ORDER — OXYTOCIN 10 UNIT/ML
333 VIAL (ML) INJECTION
Qty: 20 | Refills: 0 | Status: DISCONTINUED | OUTPATIENT
Start: 2023-11-03 | End: 2023-11-03

## 2023-11-03 RX ORDER — INFLUENZA VIRUS VACCINE 15; 15; 15; 15 UG/.5ML; UG/.5ML; UG/.5ML; UG/.5ML
0.5 SUSPENSION INTRAMUSCULAR ONCE
Refills: 0 | Status: COMPLETED | OUTPATIENT
Start: 2023-11-03 | End: 2023-11-03

## 2023-11-03 RX ORDER — PRAMOXINE HYDROCHLORIDE 150 MG/15G
1 AEROSOL, FOAM RECTAL EVERY 4 HOURS
Refills: 0 | Status: DISCONTINUED | OUTPATIENT
Start: 2023-11-03 | End: 2023-11-04

## 2023-11-03 RX ORDER — OXYTOCIN 10 UNIT/ML
2 VIAL (ML) INJECTION
Qty: 30 | Refills: 0 | Status: DISCONTINUED | OUTPATIENT
Start: 2023-11-03 | End: 2023-11-04

## 2023-11-03 RX ORDER — DIBUCAINE 1 %
1 OINTMENT (GRAM) RECTAL EVERY 6 HOURS
Refills: 0 | Status: DISCONTINUED | OUTPATIENT
Start: 2023-11-03 | End: 2023-11-04

## 2023-11-03 RX ORDER — KETOROLAC TROMETHAMINE 30 MG/ML
30 SYRINGE (ML) INJECTION ONCE
Refills: 0 | Status: DISCONTINUED | OUTPATIENT
Start: 2023-11-03 | End: 2023-11-03

## 2023-11-03 RX ORDER — AER TRAVELER 0.5 G/1
1 SOLUTION RECTAL; TOPICAL EVERY 4 HOURS
Refills: 0 | Status: DISCONTINUED | OUTPATIENT
Start: 2023-11-03 | End: 2023-11-04

## 2023-11-03 RX ORDER — LEVOTHYROXINE SODIUM 125 MCG
75 TABLET ORAL DAILY
Refills: 0 | Status: DISCONTINUED | OUTPATIENT
Start: 2023-11-03 | End: 2023-11-04

## 2023-11-03 RX ORDER — HYDROCORTISONE 1 %
1 OINTMENT (GRAM) TOPICAL EVERY 6 HOURS
Refills: 0 | Status: DISCONTINUED | OUTPATIENT
Start: 2023-11-03 | End: 2023-11-04

## 2023-11-03 RX ORDER — OXYTOCIN 10 UNIT/ML
41.67 VIAL (ML) INJECTION
Qty: 20 | Refills: 0 | Status: DISCONTINUED | OUTPATIENT
Start: 2023-11-03 | End: 2023-11-04

## 2023-11-03 RX ORDER — OXYTOCIN 10 UNIT/ML
333 VIAL (ML) INJECTION
Qty: 20 | Refills: 0 | Status: DISCONTINUED | OUTPATIENT
Start: 2023-11-03 | End: 2023-11-04

## 2023-11-03 RX ORDER — OXYTOCIN 10 UNIT/ML
1 VIAL (ML) INJECTION
Qty: 30 | Refills: 0 | Status: DISCONTINUED | OUTPATIENT
Start: 2023-11-03 | End: 2023-11-03

## 2023-11-03 RX ORDER — OXYCODONE HYDROCHLORIDE 5 MG/1
5 TABLET ORAL
Refills: 0 | Status: DISCONTINUED | OUTPATIENT
Start: 2023-11-03 | End: 2023-11-04

## 2023-11-03 RX ORDER — TETANUS TOXOID, REDUCED DIPHTHERIA TOXOID AND ACELLULAR PERTUSSIS VACCINE, ADSORBED 5; 2.5; 8; 8; 2.5 [IU]/.5ML; [IU]/.5ML; UG/.5ML; UG/.5ML; UG/.5ML
0.5 SUSPENSION INTRAMUSCULAR ONCE
Refills: 0 | Status: DISCONTINUED | OUTPATIENT
Start: 2023-11-03 | End: 2023-11-04

## 2023-11-03 RX ORDER — SODIUM CHLORIDE 9 MG/ML
3 INJECTION INTRAMUSCULAR; INTRAVENOUS; SUBCUTANEOUS EVERY 8 HOURS
Refills: 0 | Status: DISCONTINUED | OUTPATIENT
Start: 2023-11-03 | End: 2023-11-04

## 2023-11-03 RX ORDER — OXYCODONE HYDROCHLORIDE 5 MG/1
5 TABLET ORAL ONCE
Refills: 0 | Status: DISCONTINUED | OUTPATIENT
Start: 2023-11-03 | End: 2023-11-04

## 2023-11-03 RX ORDER — SIMETHICONE 80 MG/1
80 TABLET, CHEWABLE ORAL EVERY 4 HOURS
Refills: 0 | Status: DISCONTINUED | OUTPATIENT
Start: 2023-11-03 | End: 2023-11-04

## 2023-11-03 RX ORDER — BENZOYL PEROXIDE MICRONIZED 5.8 %
1 TOWELETTE (EA) TOPICAL
Refills: 0 | DISCHARGE

## 2023-11-03 RX ORDER — ONDANSETRON 8 MG/1
4 TABLET, FILM COATED ORAL ONCE
Refills: 0 | Status: COMPLETED | OUTPATIENT
Start: 2023-11-03 | End: 2023-11-03

## 2023-11-03 RX ORDER — BENZOCAINE 10 %
1 GEL (GRAM) MUCOUS MEMBRANE EVERY 6 HOURS
Refills: 0 | Status: DISCONTINUED | OUTPATIENT
Start: 2023-11-03 | End: 2023-11-04

## 2023-11-03 RX ORDER — IBUPROFEN 200 MG
600 TABLET ORAL EVERY 6 HOURS
Refills: 0 | Status: DISCONTINUED | OUTPATIENT
Start: 2023-11-03 | End: 2023-11-04

## 2023-11-03 RX ORDER — DIPHENHYDRAMINE HCL 50 MG
25 CAPSULE ORAL EVERY 6 HOURS
Refills: 0 | Status: DISCONTINUED | OUTPATIENT
Start: 2023-11-03 | End: 2023-11-04

## 2023-11-03 RX ORDER — ACETAMINOPHEN 500 MG
975 TABLET ORAL
Refills: 0 | Status: DISCONTINUED | OUTPATIENT
Start: 2023-11-03 | End: 2023-11-04

## 2023-11-03 RX ORDER — LANOLIN
1 OINTMENT (GRAM) TOPICAL EVERY 6 HOURS
Refills: 0 | Status: DISCONTINUED | OUTPATIENT
Start: 2023-11-03 | End: 2023-11-04

## 2023-11-03 RX ORDER — IBUPROFEN 200 MG
1 TABLET ORAL
Qty: 0 | Refills: 0 | DISCHARGE
Start: 2023-11-03

## 2023-11-03 RX ORDER — IBUPROFEN 200 MG
600 TABLET ORAL EVERY 6 HOURS
Refills: 0 | Status: COMPLETED | OUTPATIENT
Start: 2023-11-03 | End: 2024-10-01

## 2023-11-03 RX ADMIN — Medication 0.2 MILLIGRAM(S): at 16:16

## 2023-11-03 RX ADMIN — Medication 108 GRAM(S): at 09:25

## 2023-11-03 RX ADMIN — Medication 600 MILLIGRAM(S): at 23:28

## 2023-11-03 RX ADMIN — Medication 600 MILLIGRAM(S): at 23:07

## 2023-11-03 RX ADMIN — Medication 200 GRAM(S): at 00:15

## 2023-11-03 RX ADMIN — ONDANSETRON 4 MILLIGRAM(S): 8 TABLET, FILM COATED ORAL at 05:15

## 2023-11-03 RX ADMIN — CHLORHEXIDINE GLUCONATE 1 APPLICATION(S): 213 SOLUTION TOPICAL at 04:08

## 2023-11-03 RX ADMIN — Medication 975 MILLIGRAM(S): at 23:00

## 2023-11-03 RX ADMIN — SODIUM CHLORIDE 125 MILLILITER(S): 9 INJECTION, SOLUTION INTRAVENOUS at 04:06

## 2023-11-03 RX ADMIN — Medication 30 MILLIGRAM(S): at 19:50

## 2023-11-03 RX ADMIN — Medication 1 MILLIUNIT(S)/MIN: at 06:52

## 2023-11-03 RX ADMIN — Medication 0.2 MILLIGRAM(S): at 23:08

## 2023-11-03 RX ADMIN — SODIUM CHLORIDE 125 MILLILITER(S): 9 INJECTION, SOLUTION INTRAVENOUS at 09:25

## 2023-11-03 RX ADMIN — Medication 30 MILLIGRAM(S): at 19:24

## 2023-11-03 RX ADMIN — Medication 975 MILLIGRAM(S): at 21:11

## 2023-11-03 RX ADMIN — Medication 2 MILLIUNIT(S)/MIN: at 09:25

## 2023-11-03 RX ADMIN — Medication 75 MICROGRAM(S): at 06:40

## 2023-11-03 RX ADMIN — Medication 108 GRAM(S): at 13:16

## 2023-11-03 RX ADMIN — Medication 108 GRAM(S): at 04:05

## 2023-11-03 RX ADMIN — SODIUM CHLORIDE 3 MILLILITER(S): 9 INJECTION INTRAMUSCULAR; INTRAVENOUS; SUBCUTANEOUS at 23:29

## 2023-11-03 NOTE — OB RN PATIENT PROFILE - FUNCTIONAL ASSESSMENT - BASIC MOBILITY 6.
[place] Pregnancy And Lactation Text: This medication is not safe during pregnancy. It is unknown if this medication is excreted in breast milk. 4 = No assist / stand by assistance

## 2023-11-03 NOTE — OB PROVIDER DELIVERY SUMMARY - NSPROVIDERDELIVERYNOTE_OBGYN_ALL_OB_FT
Pt found to be fully dilated with urge to push,  of live born male infant. Head, shoulders and body delivered easily in OA position.  Cord clamping was delayed and then cut uncomplicated. Placenta delivered intact. Bimanual exam performed, with clots evacuated. Atony was noted and patient received methergine 0.2 IM, cytotec 1000mcg per rectum and pitocin 20U given. Fundus and lower uterine segment firm. Vaginal exam revealed intact cervix, vaginal walls, sulci. 2nd degree laceration identified and repaired in usual fashion with chromic suture.  Good hemostasis noted at the end of delivery.    Cynthia Smith, PGY-1

## 2023-11-03 NOTE — DISCHARGE NOTE OB - HOSPITAL COURSE
Patient admitted for PROM induction of labor, TOLAC, had a successful  of a live male infant, her postpartum course was uncomplicated

## 2023-11-03 NOTE — DISCHARGE NOTE OB - MEDICATION SUMMARY - MEDICATIONS TO TAKE
I will START or STAY ON the medications listed below when I get home from the hospital:    ibuprofen 600 mg oral tablet  -- 1 tab(s) by mouth every 6 hours  -- Indication: For Pain    levothyroxine 75 mcg (0.075 mg) oral tablet  -- 1 tab(s) by mouth once a day  -- Indication: For Hypothyroid

## 2023-11-03 NOTE — DISCHARGE NOTE OB - PATIENT PORTAL LINK FT
You can access the FollowMyHealth Patient Portal offered by [facility] by registering at the following website: [email or web address]. By joining [facility]’s FollowMyHealth portal, you will also be able to view your health information using other applications (apps) compatible with our system.

## 2023-11-03 NOTE — OB PROVIDER LABOR PROGRESS NOTE - ASSESSMENT
33 [id number] at 40w1 IOL for PROM, TOLAC    - Continue Pitocin  - Continue EFM  - IUPC in place  -Anticipate      D/w Dr. Wisam Smith. PGY1  
cat I, c/w current mgmt    PLAN:  Continuous FHT/[place]  Maternal/fetal status reassuring  Will continue to reassess prn.    Akhil Hernandez PGY1

## 2023-11-03 NOTE — OB RN DELIVERY SUMMARY - NSSELHIDDEN_OBGYN_ALL_OB_FT
[NS_DeliveryAttending1_OBGYN_ALL_OB_FT:[id number]=],[NS_DeliveryAssist1_OBGYN_ALL_OB_FT:[id number]=],[NS_DeliveryRN_OBGYN_ALL_OB_FT:[id number]=]

## 2023-11-03 NOTE — DISCHARGE NOTE OB - CARE PROVIDER_API CALL
Kelton Joel  Obstetrics and Gynecology  [address], [address]  [place] [address]  Phone: [phone]  Fax: [phone]  Follow Up Time:

## 2023-11-03 NOTE — OB RN DELIVERY SUMMARY - NS_RESUSCITMEDS_OBGYN_ALL_OB
VSS afebrile. Patient denies any itching, rash or discomfort. Patient tolerating PRBC at this time. No

## 2023-11-03 NOTE — DISCHARGE NOTE OB - MATERIALS PROVIDED
Vaccinations/[facility]  Screening Program/  Immunization Record/Breastfeeding Log/Breastfeeding Mother’s Support Group Information/Guide to Postpartum Care/[facility] Hearing Screen Program/Back To Sleep Handout/Shaken Baby Prevention Handout/Breastfeeding Guide and Packet/Discharge Medication Information for Patients and Families Pocket Guide

## 2023-11-03 NOTE — OB RN DELIVERY SUMMARY - NS_SEPSISRSKCALC_OBGYN_ALL_OB_FT
Gave verbal and written dc instructions, pt verbalized understanding. Pt awaiting parents for ride home   EOS calculated successfully. EOS Risk Factor: 0.5/1000 live births ([facility] national incidence); GA=40w1d; Temp=98.42; ROM=20.717; GBS='Positive'; Antibiotics='GBS specific antibiotics > 2 hrs prior to birth'

## 2023-11-03 NOTE — OB PROVIDER DELIVERY SUMMARY - NSSELHIDDEN_OBGYN_ALL_OB_FT
[NS_DeliveryAttending1_OBGYN_ALL_OB_FT:[id number]=],[NS_DeliveryAssist1_OBGYN_ALL_OB_FT:[id number]=]

## 2023-11-03 NOTE — DISCHARGE NOTE OB - CARE PLAN
1 Principal Discharge DX:	Vaginal birth after   Assessment and plan of treatment:	Return to normal activities of daily living

## 2023-11-03 NOTE — OB PROVIDER LABOR PROGRESS NOTE - NS_SUBJECTIVE/OBJECTIVE_OBGYN_ALL_OB_FT
11-03-23 @ 05:15  Patient was evaluated at bedside.  Her cervix was checked and found to be 3/70/-3
Patient re-examined for progress, VE unchanged at 370/-3 but forebag palpated. AROM performed during ctx, clear fluid. IUPC placed. Pitocin at 14, NST cat 1 with ctx Q2min. Continue current management. Anticipate .    Kelton Joel MD
Patient re-examined for progress. Now 10/100/0. Again reviewed pushing, anticipate .    Kelton Joel MD
Patient seen at bedside for cervical exam.

## 2023-11-03 NOTE — OB RN PATIENT PROFILE - FALL HARM RISK - UNIVERSAL INTERVENTIONS
Bed in lowest position, wheels locked, appropriate side rails in place/Call bell, personal items and telephone in reach/Instruct patient to call for assistance before getting out of bed or chair/Non-slip footwear when patient is out of bed/[place] to call system/Physically safe environment - no spills, clutter or unnecessary equipment/Purposeful Proactive Rounding/Room/bathroom lighting operational, light cord in reach

## 2023-11-04 VITALS
TEMPERATURE: 98 F | RESPIRATION RATE: 16 BRPM | HEART RATE: 72 BPM | SYSTOLIC BLOOD PRESSURE: 113 MMHG | DIASTOLIC BLOOD PRESSURE: 73 MMHG | OXYGEN SATURATION: 100 %

## 2023-11-04 LAB
HCT VFR BLD CALC: 33.5 % — LOW (ref 34.5–45)
HCT VFR BLD CALC: 33.5 % — LOW (ref 34.5–45)
HGB BLD-MCNC: 12 G/DL — SIGNIFICANT CHANGE UP (ref 11.5–15.5)
HGB BLD-MCNC: 12 G/DL — SIGNIFICANT CHANGE UP (ref 11.5–15.5)
MCHC RBC-ENTMCNC: 30.5 PG — SIGNIFICANT CHANGE UP (ref 27–34)
MCHC RBC-ENTMCNC: 30.5 PG — SIGNIFICANT CHANGE UP (ref 27–34)
MCHC RBC-ENTMCNC: 35.8 GM/DL — SIGNIFICANT CHANGE UP (ref 32–36)
MCHC RBC-ENTMCNC: 35.8 GM/DL — SIGNIFICANT CHANGE UP (ref 32–36)
MCV RBC AUTO: 85.2 FL — SIGNIFICANT CHANGE UP (ref 80–100)
MCV RBC AUTO: 85.2 FL — SIGNIFICANT CHANGE UP (ref 80–100)
NRBC # BLD: 0 /100 WBCS — SIGNIFICANT CHANGE UP (ref 0–0)
NRBC # BLD: 0 /100 WBCS — SIGNIFICANT CHANGE UP (ref 0–0)
NRBC # FLD: 0 K/UL — SIGNIFICANT CHANGE UP (ref 0–0)
NRBC # FLD: 0 K/UL — SIGNIFICANT CHANGE UP (ref 0–0)
PLATELET # BLD AUTO: 169 K/UL — SIGNIFICANT CHANGE UP (ref 150–400)
PLATELET # BLD AUTO: 169 K/UL — SIGNIFICANT CHANGE UP (ref 150–400)
RBC # BLD: 3.93 M/UL — SIGNIFICANT CHANGE UP (ref 3.8–5.2)
RBC # BLD: 3.93 M/UL — SIGNIFICANT CHANGE UP (ref 3.8–5.2)
RBC # FLD: 14.8 % — HIGH (ref 10.3–14.5)
RBC # FLD: 14.8 % — HIGH (ref 10.3–14.5)
WBC # BLD: 16.96 K/UL — HIGH (ref 3.8–10.5)
WBC # BLD: 16.96 K/UL — HIGH (ref 3.8–10.5)
WBC # FLD AUTO: 16.96 K/UL — HIGH (ref 3.8–10.5)
WBC # FLD AUTO: 16.96 K/UL — HIGH (ref 3.8–10.5)

## 2023-11-04 RX ADMIN — Medication 600 MILLIGRAM(S): at 05:47

## 2023-11-04 RX ADMIN — Medication 600 MILLIGRAM(S): at 18:15

## 2023-11-04 RX ADMIN — SODIUM CHLORIDE 3 MILLILITER(S): 9 INJECTION INTRAMUSCULAR; INTRAVENOUS; SUBCUTANEOUS at 05:47

## 2023-11-04 RX ADMIN — Medication 975 MILLIGRAM(S): at 09:00

## 2023-11-04 RX ADMIN — Medication 600 MILLIGRAM(S): at 17:39

## 2023-11-04 RX ADMIN — Medication 0.2 MILLIGRAM(S): at 02:27

## 2023-11-04 RX ADMIN — SODIUM CHLORIDE 3 MILLILITER(S): 9 INJECTION INTRAMUSCULAR; INTRAVENOUS; SUBCUTANEOUS at 14:01

## 2023-11-04 RX ADMIN — Medication 600 MILLIGRAM(S): at 05:34

## 2023-11-04 RX ADMIN — Medication 75 MICROGRAM(S): at 05:34

## 2023-11-04 RX ADMIN — Medication 0.2 MILLIGRAM(S): at 13:45

## 2023-11-04 RX ADMIN — Medication 0.2 MILLIGRAM(S): at 19:52

## 2023-11-04 RX ADMIN — Medication 975 MILLIGRAM(S): at 08:15

## 2023-11-04 RX ADMIN — Medication 975 MILLIGRAM(S): at 22:15

## 2023-11-04 RX ADMIN — Medication 975 MILLIGRAM(S): at 23:15

## 2023-11-04 RX ADMIN — Medication 600 MILLIGRAM(S): at 11:12

## 2023-11-04 RX ADMIN — Medication 600 MILLIGRAM(S): at 12:00

## 2023-11-04 RX ADMIN — Medication 1 TABLET(S): at 11:12

## 2023-11-04 RX ADMIN — Medication 0.2 MILLIGRAM(S): at 08:15

## 2023-11-04 NOTE — PROGRESS NOTE ADULT - ATTENDING COMMENTS
saw and examined patient  doing well  desires to be discharged this evening  reviewed discharge instructions

## 2023-11-04 NOTE — PROGRESS NOTE ADULT - SUBJECTIVE AND OBJECTIVE BOX
32yo  PPD#1 s/p . Patient feels well. Pain is well controlled, tolerating regular diet, passing flatus, voiding spontaneously, ambulating without difficulty. Denies heavy vaginal bleeding, CP/SOB, N/V, lightheadedness/dizziness.     O:  Vitals:  Vital Signs Last 24 Hrs  T(C): 36.4 (2023 05:52), Max: 36.8 (2023 13:18)  T(F): 97.6 (2023 05:52), Max: 98.3 (2023 01:21)  HR: 80 (2023 05:52) (66 - 251)  BP: 110/67 (2023 05:52) (105/71 - 153/70)  BP(mean): --  RR: 18 (2023 05:52) (18 - 18)  SpO2: 100% (2023 05:52) (51% - 100%)    Parameters below as of 2023 05:52  Patient On (Oxygen Delivery Method): room air        MEDICATIONS  (STANDING):  acetaminophen     Tablet .. 975 milliGRAM(s) Oral <User Schedule>  diphtheria/tetanus/pertussis (acellular) Vaccine (Adacel) 0.5 milliLiter(s) IntraMuscular once  ibuprofen  Tablet. 600 milliGRAM(s) Oral every 6 hours  influenza   Vaccine 0.5 milliLiter(s) IntraMuscular once  levothyroxine 75 MICROGram(s) Oral daily  methylergonovine 0.2 milliGRAM(s) Oral every 6 hours  oxytocin Infusion 41.667 milliUNIT(s)/Min (125 mL/Hr) IV Continuous <Continuous>  oxytocin Infusion 333 milliUNIT(s)/Min (999 mL/Hr) IV Continuous <Continuous>  oxytocin Infusion. 2 milliUNIT(s)/Min (2 mL/Hr) IV Continuous <Continuous>  prenatal multivitamin 1 Tablet(s) Oral daily  sodium chloride 0.9% lock flush 3 milliLiter(s) IV Push every 8 hours      Labs:  Blood type: O Positive  Rubella IgG: RPR: Negative                          12.0   16.96<H> >-----------< 169    (  @ 05:48 )             33.5<L>                        12.3   10.62<H> >-----------< 193    (  @ 22:45 )             35.0                  Physical Exam:  General: NAD  CV: RRR  Resp: CTAB  Abdomen: soft, non-tender, non-distended, fundus firm  : Nl lochia  Extremities: No erythema/edema

## 2023-11-04 NOTE — PROGRESS NOTE ADULT - ASSESSMENT
A/P: 34yo PPD#1 s/p .  Patient is stable and doing well post-partum.   - Pain well controlled, continue current pain regimen  - Increase ambulation, SCDs when not ambulating  - Continue regular diet    Akhil Hernandez MD PGY1

## 2023-12-13 ENCOUNTER — APPOINTMENT (OUTPATIENT)
Dept: OBGYN | Facility: CLINIC | Age: 33
End: 2023-12-13
Payer: MEDICAID

## 2023-12-13 VITALS
HEIGHT: 60 IN | BODY MASS INDEX: 24.35 KG/M2 | DIASTOLIC BLOOD PRESSURE: 91 MMHG | SYSTOLIC BLOOD PRESSURE: 135 MMHG | HEART RATE: 87 BPM | WEIGHT: 124 LBS

## 2023-12-13 PROCEDURE — 99212 OFFICE O/P EST SF 10 MIN: CPT | Mod: TH

## 2023-12-13 NOTE — HISTORY OF PRESENT ILLNESS
[Postpartum Follow Up] : postpartum follow up [Vaginal Delivery] : vaginal delivery [Delivery Date Was ___] : delivery date was [unfilled] [Boy] : baby is a boy [Infant's Name ___] : [unfilled] [___ Lbs] : [unfilled] lbs [___ Oz] : [unfilled] oz [Circumcised] : circumcised [Living at Home] : is currently living at home [Complications:___] : no complications [Breastfeeding] : currently nursing [BF with Difficulty] : nursing without difficulty [Resumed Menses] : has not resumed her menses [Resumed Ronda] : has not resumed intercourse [Intended Contraception] : Intended Contraception: [IUD] : intrauterine device [Back to Normal] : is back to normal in size [Normal] : the vagina was normal [Cervix Sample Taken] : cervical sample not taken for a Pap smear [Examination Of The Breasts] : breasts are normal [None] : None [de-identified] : 34 y/o P3 s/p  for postpartum visit, doing well [de-identified] : EPDS score 0; doing well, interested in Mirena IUD for contraception; billing dept tasked; can resume normal activities; RTO for annual or PRN

## 2023-12-15 LAB
C TRACH RRNA SPEC QL NAA+PROBE: NOT DETECTED
N GONORRHOEA RRNA SPEC QL NAA+PROBE: NOT DETECTED
SOURCE AMPLIFICATION: NORMAL

## 2024-01-10 ENCOUNTER — APPOINTMENT (OUTPATIENT)
Dept: FAMILY MEDICINE | Facility: CLINIC | Age: 34
End: 2024-01-10

## 2024-02-09 ENCOUNTER — APPOINTMENT (OUTPATIENT)
Dept: FAMILY MEDICINE | Facility: CLINIC | Age: 34
End: 2024-02-09

## 2024-02-20 ENCOUNTER — RX RENEWAL (OUTPATIENT)
Age: 34
End: 2024-02-20

## 2024-02-20 RX ORDER — LEVOTHYROXINE SODIUM 0.07 MG/1
75 TABLET ORAL
Qty: 30 | Refills: 3 | Status: ACTIVE | COMMUNITY
Start: 2023-04-13 | End: 1900-01-01

## 2024-05-01 ENCOUNTER — NON-APPOINTMENT (OUTPATIENT)
Age: 34
End: 2024-05-01

## 2024-05-02 ENCOUNTER — TRANSCRIPTION ENCOUNTER (OUTPATIENT)
Age: 34
End: 2024-05-02

## 2024-05-03 ENCOUNTER — NON-APPOINTMENT (OUTPATIENT)
Age: 34
End: 2024-05-03

## 2024-05-03 ENCOUNTER — EMERGENCY (EMERGENCY)
Facility: HOSPITAL | Age: 34
LOS: 0 days | Discharge: HOME | End: 2024-05-04
Attending: STUDENT IN AN ORGANIZED HEALTH CARE EDUCATION/TRAINING PROGRAM
Payer: MEDICAID

## 2024-05-03 VITALS
TEMPERATURE: 98 F | HEIGHT: 62 IN | SYSTOLIC BLOOD PRESSURE: 136 MMHG | RESPIRATION RATE: 18 BRPM | OXYGEN SATURATION: 96 % | WEIGHT: 126.1 LBS | HEART RATE: 84 BPM | DIASTOLIC BLOOD PRESSURE: 90 MMHG

## 2024-05-03 DIAGNOSIS — R25.2 CRAMP AND SPASM: ICD-10-CM

## 2024-05-03 DIAGNOSIS — R63.0 ANOREXIA: ICD-10-CM

## 2024-05-03 DIAGNOSIS — G47.00 INSOMNIA, UNSPECIFIED: ICD-10-CM

## 2024-05-03 DIAGNOSIS — Z98.891 HISTORY OF UTERINE SCAR FROM PREVIOUS SURGERY: Chronic | ICD-10-CM

## 2024-05-03 DIAGNOSIS — Z98.890 OTHER SPECIFIED POSTPROCEDURAL STATES: Chronic | ICD-10-CM

## 2024-05-03 DIAGNOSIS — E03.9 HYPOTHYROIDISM, UNSPECIFIED: ICD-10-CM

## 2024-05-03 DIAGNOSIS — R42 DIZZINESS AND GIDDINESS: ICD-10-CM

## 2024-05-03 LAB
ALBUMIN SERPL ELPH-MCNC: 3.5 G/DL — SIGNIFICANT CHANGE UP (ref 3.3–5)
ALP SERPL-CCNC: 71 U/L — SIGNIFICANT CHANGE UP (ref 40–120)
ALT FLD-CCNC: 17 U/L — SIGNIFICANT CHANGE UP (ref 12–78)
ANION GAP SERPL CALC-SCNC: 5 MMOL/L — SIGNIFICANT CHANGE UP (ref 5–17)
APAP SERPL-MCNC: 7 UG/ML — LOW (ref 10–30)
AST SERPL-CCNC: 16 U/L — SIGNIFICANT CHANGE UP (ref 15–37)
BASOPHILS # BLD AUTO: 0.05 K/UL — SIGNIFICANT CHANGE UP (ref 0–0.2)
BASOPHILS NFR BLD AUTO: 0.6 % — SIGNIFICANT CHANGE UP (ref 0–2)
BILIRUB SERPL-MCNC: 0.4 MG/DL — SIGNIFICANT CHANGE UP (ref 0.2–1.2)
BUN SERPL-MCNC: 10 MG/DL — SIGNIFICANT CHANGE UP (ref 7–23)
CALCIUM SERPL-MCNC: 8.9 MG/DL — SIGNIFICANT CHANGE UP (ref 8.5–10.1)
CHLORIDE SERPL-SCNC: 109 MMOL/L — HIGH (ref 96–108)
CK SERPL-CCNC: 77 U/L — SIGNIFICANT CHANGE UP (ref 26–192)
CO2 SERPL-SCNC: 25 MMOL/L — SIGNIFICANT CHANGE UP (ref 22–31)
CREAT SERPL-MCNC: 0.59 MG/DL — SIGNIFICANT CHANGE UP (ref 0.5–1.3)
EGFR: 122 ML/MIN/1.73M2 — SIGNIFICANT CHANGE UP
EOSINOPHIL # BLD AUTO: 0.21 K/UL — SIGNIFICANT CHANGE UP (ref 0–0.5)
EOSINOPHIL NFR BLD AUTO: 2.5 % — SIGNIFICANT CHANGE UP (ref 0–6)
ETHANOL SERPL-MCNC: <10 MG/DL — SIGNIFICANT CHANGE UP (ref 0–10)
GLUCOSE SERPL-MCNC: 90 MG/DL — SIGNIFICANT CHANGE UP (ref 70–99)
HCG SERPL-ACNC: <1 MIU/ML — SIGNIFICANT CHANGE UP
HCT VFR BLD CALC: 34.4 % — LOW (ref 34.5–45)
HGB BLD-MCNC: 11.8 G/DL — SIGNIFICANT CHANGE UP (ref 11.5–15.5)
HIV 1 & 2 AB SERPL IA.RAPID: SIGNIFICANT CHANGE UP
IMM GRANULOCYTES NFR BLD AUTO: 0.1 % — SIGNIFICANT CHANGE UP (ref 0–0.9)
LYMPHOCYTES # BLD AUTO: 3.43 K/UL — HIGH (ref 1–3.3)
LYMPHOCYTES # BLD AUTO: 41.4 % — SIGNIFICANT CHANGE UP (ref 13–44)
MCHC RBC-ENTMCNC: 28.9 PG — SIGNIFICANT CHANGE UP (ref 27–34)
MCHC RBC-ENTMCNC: 34.3 G/DL — SIGNIFICANT CHANGE UP (ref 32–36)
MCV RBC AUTO: 84.1 FL — SIGNIFICANT CHANGE UP (ref 80–100)
MONOCYTES # BLD AUTO: 0.42 K/UL — SIGNIFICANT CHANGE UP (ref 0–0.9)
MONOCYTES NFR BLD AUTO: 5.1 % — SIGNIFICANT CHANGE UP (ref 2–14)
NEUTROPHILS # BLD AUTO: 4.17 K/UL — SIGNIFICANT CHANGE UP (ref 1.8–7.4)
NEUTROPHILS NFR BLD AUTO: 50.3 % — SIGNIFICANT CHANGE UP (ref 43–77)
NRBC # BLD: 0 /100 WBCS — SIGNIFICANT CHANGE UP (ref 0–0)
PLATELET # BLD AUTO: 243 K/UL — SIGNIFICANT CHANGE UP (ref 150–400)
POTASSIUM SERPL-MCNC: 3.7 MMOL/L — SIGNIFICANT CHANGE UP (ref 3.5–5.3)
POTASSIUM SERPL-SCNC: 3.7 MMOL/L — SIGNIFICANT CHANGE UP (ref 3.5–5.3)
PROT SERPL-MCNC: 7.6 GM/DL — SIGNIFICANT CHANGE UP (ref 6–8.3)
RBC # BLD: 4.09 M/UL — SIGNIFICANT CHANGE UP (ref 3.8–5.2)
RBC # FLD: 11.9 % — SIGNIFICANT CHANGE UP (ref 10.3–14.5)
SALICYLATES SERPL-MCNC: <1.7 MG/DL — LOW (ref 2.8–20)
SODIUM SERPL-SCNC: 139 MMOL/L — SIGNIFICANT CHANGE UP (ref 135–145)
WBC # BLD: 8.29 K/UL — SIGNIFICANT CHANGE UP (ref 3.8–10.5)
WBC # FLD AUTO: 8.29 K/UL — SIGNIFICANT CHANGE UP (ref 3.8–10.5)

## 2024-05-03 PROCEDURE — 99285 EMERGENCY DEPT VISIT HI MDM: CPT

## 2024-05-03 PROCEDURE — 70450 CT HEAD/BRAIN W/O DYE: CPT | Mod: 26,MC

## 2024-05-03 RX ORDER — LEVOTHYROXINE SODIUM 125 MCG
1 TABLET ORAL
Qty: 0 | Refills: 0 | DISCHARGE

## 2024-05-03 RX ORDER — SODIUM CHLORIDE 9 MG/ML
1000 INJECTION INTRAMUSCULAR; INTRAVENOUS; SUBCUTANEOUS ONCE
Refills: 0 | Status: COMPLETED | OUTPATIENT
Start: 2024-05-03 | End: 2024-05-03

## 2024-05-03 RX ADMIN — SODIUM CHLORIDE 1000 MILLILITER(S): 9 INJECTION INTRAMUSCULAR; INTRAVENOUS; SUBCUTANEOUS at 17:57

## 2024-05-03 NOTE — ED PROVIDER NOTE - PHYSICAL EXAMINATION
General: Well appearing female in no acute distress  HEENT: Normocephalic, atraumatic. Moist mucous membranes. Oropharynx clear. No lymphadenopathy.  Eyes: No scleral icterus. EOMI. MISAEL.  Neck:. Soft and supple. Full ROM without pain. No midline tenderness  Cardiac: Regular rate and regular rhythm. No murmurs, rubs, gallops. Peripheral pulses 2+ and symmetric. No LE edema.  Resp: Lungs CTAB. Speaking in full sentences. No wheezes, rales or rhonchi.  Abd: Soft, non-tender, non-distended. No guarding or rebound. No scars, masses, or lesions.  Back: Spine midline and non-tender. No CVA tenderness.    Skin: No rashes, abrasions, or lacerations.  Neuro: AO x 3. Moves all extremities symmetrically. Motor strength and sensation grossly intact.

## 2024-05-03 NOTE — ED ADULT NURSE NOTE - ABNORMAL MOVEMENTS
Future appt: 10/27/21  Last OV: 8/28/2020  Last refill:  SYNTHROID 50 MCG Oral Tab 90 tablet 0 6/24/2021    Sig:   Take 1 tablet (50 mcg total) by mouth before breakfast.     Route:   Oral     ROXANNE:   Yes     Spoke with patient, advised labs at [facility] need t No abnormal movements

## 2024-05-03 NOTE — ED PROVIDER NOTE - PROGRESS NOTE DETAILS
spoke to patient obgyn for collateral. - states patient needs psych eval. has been speaking to patient and .  states wife breaking all the things, states she may have been poisoned in the past.  told him that she was hitting him. was recommended for patient to go to ER -  states he was going to take her but she never went yesterday. -[facility] telepsych consulted, eval pending. handoff received from Dr. Baeza, awaiting tele psych reevaluation. - John PINTO appreciate psych eval from Dr. Santos, she has cleared the patient so will discharge her as per signout from Dr. Baeza. Emanuel Lopez MD

## 2024-05-03 NOTE — ED ADULT NURSE NOTE - OBJECTIVE STATEMENT
Pt presents to ED A&Ox3 accompanied by  and baby c/o dizziness and lightheadedness x 5 days. Pt presents to ED A&Ox3 accompanied by  and baby c/o dizziness, lightheadedness, decreased appetite, decreased sleep and feeling down x 5 days. Pt states "My  is cheating on me and it is with a close family friend and I keep thinking about it. This is what is causing this" Pt denies urinary symptoms, SI, HI, SOB, chest pain, N/V or diarrhea. Pt gave birth 6 months ago. Pt has a reported hx of hypothyroidism

## 2024-05-03 NOTE — ED PROVIDER NOTE - PATIENT PORTAL LINK FT
3-point gait You can access the FollowMyHealth Patient Portal offered by [facility] by registering at the following website: [email or web address]. By joining [facility]’s FollowMyHealth portal, you will also be able to view your health information using other applications (apps) compatible with our system.

## 2024-05-03 NOTE — ED PROVIDER NOTE - CLINICAL SUMMARY MEDICAL DECISION MAKING FREE TEXT BOX
33 y/o F hx 33 y/o F no pmhx presents for lightheadedness for past 6 days. endorsing decreased appetite. endorsing feeling sad. denies SI / HI. denies chest pain/sob. denies abdominal pain. denies nausea/vomiting. denies fever/chills. denies trauma. endorsing bilateral leg cramping sensation. delivered baby aprox 6 months ago . denies SI/HI.  spoke to patient obgyn for collateral. - states patient needs psych eval. has been speaking to patient and .  states wife breaking all the things, states she may have been poisoned in the past.  told him that she was hitting him. was recommended for patient to go to ER -  states he was going to take her but she never went yesterday.     will get labs, CT head. psych eval. 33 y/o F no pmhx presents for lightheadedness for past 6 days. endorsing decreased appetite. endorsing feeling sad. denies SI / HI. denies chest pain/sob. denies abdominal pain. denies nausea/vomiting. denies fever/chills. denies trauma. endorsing bilateral leg cramping sensation. delivered baby aprox 6 months ago . denies SI/HI.  spoke to patient obgyn for collateral. - states patient needs psych eval. has been speaking to patient and .  states wife breaking all the things, states she may have been poisoned in the past.  told him that she was hitting him. was recommended for patient to go to ER -  states he was going to take her but she never went yesterday.     will get labs, CT head. psych eval.   psych called for telepsych eval. evaluation is pending. signed out to night ER attending pending psych eval for disposition. no actionable findings seen on medical workup.

## 2024-05-03 NOTE — ED PROVIDER NOTE - NSDCPRINTRESULTS_ED_ALL_ED
In Hospital Patient requests all Lab, Cardiology, and Radiology Results on their Discharge Instructions

## 2024-05-03 NOTE — ED PROVIDER NOTE - OBJECTIVE STATEMENT
35 y/o F no pmhx presents for lightheadedness for past 6 days. endorsing decreased appetite. endorsing feeling sad. denies SI / HI. denies chest pain/sob. denies abdominal pain. denies nausea/vomiting. denies fever/chills. denies trauma. endorsing bilateral leg cramping sensation. delivered baby aprox 6 months ago . denies SI/HI.

## 2024-05-03 NOTE — ED PROVIDER NOTE - NSFOLLOWUPINSTRUCTIONS_ED_ALL_ED_FT
You were seen for insomnia. You can take Benadryl (diphenhydramine) 25mg at night as needed for sleep. Also review the information provided by psychiatry. Follow up with your obgyn within 1 week. Return to the ER if you have any thoughts of harming yourself or someone else, passing out, or any other concerning symptoms.

## 2024-05-03 NOTE — ED ADULT TRIAGE NOTE - CHIEF COMPLAINT QUOTE
Pt walk in c/o lightheadedness/dizziness x5 days. Pt states she has not been able to sleep more than 2 hours for the past 6 nights. Pt denies LOC, N/V/D. FS 96

## 2024-05-04 VITALS
HEART RATE: 93 BPM | TEMPERATURE: 98 F | OXYGEN SATURATION: 98 % | DIASTOLIC BLOOD PRESSURE: 84 MMHG | RESPIRATION RATE: 19 BRPM | SYSTOLIC BLOOD PRESSURE: 113 MMHG

## 2024-05-04 DIAGNOSIS — G47.00 INSOMNIA, UNSPECIFIED: ICD-10-CM

## 2024-05-04 LAB
HCV AB S/CO SERPL IA: 0.13 S/CO — SIGNIFICANT CHANGE UP (ref 0–0.99)
HCV AB SERPL-IMP: SIGNIFICANT CHANGE UP

## 2024-05-04 PROCEDURE — 90792 PSYCH DIAG EVAL W/MED SRVCS: CPT | Mod: 95

## 2024-05-04 RX ORDER — DIPHENHYDRAMINE HCL 50 MG
25 CAPSULE ORAL ONCE
Refills: 0 | Status: COMPLETED | OUTPATIENT
Start: 2024-05-04 | End: 2024-05-04

## 2024-05-04 RX ADMIN — Medication 25 MILLIGRAM(S): at 02:41

## 2024-05-04 NOTE — ED BEHAVIORAL HEALTH ASSESSMENT NOTE - HPI (INCLUDE ILLNESS QUALITY, SEVERITY, DURATION, TIMING, CONTEXT, MODIFYING FACTORS, ASSOCIATED SIGNS AND SYMPTOMS)
its been exactly 6 days, having a hard time to sleep, close eyes and can't sleep, first 3 days (2hrs) and then last 3 days, not even a blink.     Claritin to sleep, even with that  not eating  feeling weak, light headedness, dizziness  overthinking    denies feeling like food is poisoned  when I think of happy memories int he past, I feel happi     complained something - not sure what he said  babies are doing great    no depression;    is not home most of the time, feel lonely and talk to other people, I have  so id on't usually go out, most of the time i'm home   brother is in [facility] - talk to him  some challenges in his life issues with empl    6 month old - very good baby, quiet, not even crying, good bond with baby    no psychiatric issues - never     anxiety - really strong words,   dinner party, jealous; verbal argument, and he just ran tot he doctor, because he doesn't know how to handle the situation   will go on the phone,   since , he wants to prove i'm crazy  if i'm having an argument because of another female, every signle woman he sees he wants to sleep with her and he does    I have 3 beautiful kids sand children want to see them live and grow up    Hypothyroidism - Levothryoxine 75mcg  Vitamin C, D3, iron, Fish oil  Anemic during pregnancy - iron    even hide the knife at home so kids don't get them  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     [phone] - Renan 34 year old female, domiciled with family, unemployed, with PMHx of Hypothyroidism, currently 6 months Post-partum, with no formal PPHx, no hx of psychiatric hospitalizations, no hx of SA, no hx of NSSIB, no hx of aggression/violence, denies any hx of trauma, denies any hx of substance use, referred by OBGYN for concern of psychosis.     Patient reports that she been suffering from insomnia for the past 6 days. She reports sleeping only 2-3hrs each night the first 3 nights, and then the last 3 nights she hasn't been able to sleep at night at all and has only sleep a few hrs during the day after taking Claritin to help her sleep after her kids go to sleep. She reports that due to her poor sleep she has also experienced, low appetite, some dizziness, and feeling week. She reports also sometimes overthinking due to this as well. She reports when she is unable to fall asleep at night she will just think of random memories from her life. She denies that this makes he feel anxious or depressed. She denies this feeling like "racing thoughts". She states that because she cannot sleep, she just "naturally thinks of the past." She denies any other depressive symptoms, denies excessive anxiety, denies any hypomanic/manic symptoms, and denies any psychosis. She reports being very happy with her children, reports good bonding with her infant. She states her infant is happy baby and doesn't fuss a lot, and sleeps well. Denies any past or current thoughts/urges of NSSIB, denies any past or current PSI/SI/IP/HI/AVH. When asked about SI, patient states "never, I have 3 beautiful children and I want to live and watch them grow up." When asked about weapons/firearms; she denies and states "I even hide away knives, because the twins are of an age where they want to touch everything and I don't want them to get hurt." Patient denies feeling like she is being poisoned or that people are out to get her or hurt her. Patient reports she isn't sure why she is seeing a psychiatrist, states her  said something to the OBGYN office and they referred her here. She reports she thinks he talked about the argument they had 3 days ago after having a dinner party. She expresses that her  talks to other women a lot and that does make her jealous and that they had a verbal argument about that; she expressed that she believes her  has cheated on her with other women too, and that's what the argument was about. She expresses that maybe he told the OBGYN that she was "acting crazy" because of that. She denies any other conflicts. 34 year old female, domiciled with family, unemployed, with PMHx of Hypothyroidism, currently 6 months Post-partum, with no formal PPHx, no hx of psychiatric hospitalizations, no hx of SA, no hx of NSSIB, no hx of aggression/violence, denies any hx of trauma, denies any hx of substance use, referred by OBGYN for concern of psychosis.     Patient reports that she been suffering from insomnia for the past 6 days. She reports sleeping only 2-3hrs each night the first 3 nights, and then the last 3 nights she hasn't been able to sleep at night at all and has only sleep a few hrs during the day after taking Claritin to help her sleep after her kids go to sleep. She reports that due to her poor sleep she has also experienced, low appetite, some dizziness, and feeling week. She reports also sometimes overthinking due to this as well. She reports when she is unable to fall asleep at night she will just think of random memories from her life. She denies that this makes he feel anxious or depressed. She denies this feeling like "racing thoughts". She states that because she cannot sleep, she just "naturally thinks of the past." She denies any other depressive symptoms, denies excessive anxiety, denies any hypomanic/manic symptoms, and denies any psychosis. She reports being very happy with her children, reports good bonding with her infant. She states her infant is happy baby and doesn't fuss a lot, and sleeps well. Denies any past or current thoughts/urges of NSSIB, denies any past or current PSI/SI/IP/HI/AVH. When asked about SI, patient states "never, I have 3 beautiful children and I want to live and watch them grow up." When asked about weapons/firearms; she denies and states "I even hide away knives, because the twins are of an age where they want to touch everything and I don't want them to get hurt." Patient denies feeling like she is being poisoned or that people are out to get her or hurt her. Patient reports she isn't sure why she is seeing a psychiatrist, states her  said something to the OBGYN office and they referred her here. She reports she thinks he talked about the argument they had 3 days ago after having a dinner party. She expresses that her  talks to other women a lot and that does make her jealous and that they had a verbal argument about that; she expressed that she believes her  has cheated on her with other women too, and that's what the argument was about. She expresses that maybe he told the OBGYN that she was "acting crazy" because of that. She denies any other conflicts.    Per note by Kaye Christiansen for collateral from :  "Pt is a 35 y/o female BIB  after being unable to sleep for 6 days and expressing paranoia. As per , he is unsure of what triggered pt's inability to sleep. Patient was not making sense and making statements that endorse paranoia. Pts  reports pt was speaking about people at a call center and saying they were trying to kill her. Pt woke her  up from a nap on another occasion and informed him "I know you were appointed to try to kill me." Pts david reports he went to the basement to try to get some space and pt came down to the basement 3 times and argued with him in which pts  left the home. When pt called her  and asked why he left, pts  informed her about their argument in the basment in which pt denies even going to the basement.  reports that he spoke to police who advised him to speak to an MD.  reports concerns mainly of lack of sleep and paranoia/erratic behaviors but does not currently feel like pt is a danger to herself or others.   BASELINE FUNCTIONING: See above  DATE HPI STARTED: 6 days ago  DECOMPENSATION: See HPI  SUICIDALITY None reported. No hx of self-injurious behaviors.   VIOLENCE: As per , he and pt get into arguments but no violence.   SUBSTANCE: None reported  ========================  PAST PSYCHIATRIC HISTORY  ========================  DATE PAST PSYCHIATRIC HISTORY STARTED: None  MAIN PSYCHIATRIC DIAGNOSIS: None  PSYCHIATRIC HOSPITALIZATIONS: None  PRIOR ILLNESS: None  SUICIDALITY: None reported  VIOLENCE: None reported  SUBSTANCE USE: None reported  ==============  OTHER HISTORY  ==============  CURRENT MEDICATION: None  MEDICAL HISTORY: Post partum 6 months  ALLERGIES None reported  LEGAL ISSUES: None reported  FIREARM ACCESS: None reported  SOCIAL HISTORY: Lives at home with  and 3 children  FAMILY HISTORY: None reported  DEVELOPMENTAL HISTORY: None reported."

## 2024-05-04 NOTE — ED BEHAVIORAL HEALTH PROGRESS NOTE - SUMMARY
previous rf - paranoia, insomnia, anxiety, recent birth,    Pt presented with insomnia for past 6 days, low appetite, anxiety, racing thoughts, and reported paranoia after an argument with her . Per , patient expressed paranoia that others were trying to harm her.

## 2024-05-04 NOTE — ED BEHAVIORAL HEALTH PROGRESS NOTE - SAFETY PLAN ADDT'L DETAILS
Education provided regarding environmental safety / lethal means restriction/Provision of National Suicide Prevention Lifeline 1-065-617-TALK ([phone])

## 2024-05-04 NOTE — ED BEHAVIORAL HEALTH NOTE - BEHAVIORAL HEALTH NOTE
Collateral below has requested that the information provided remain confidential: Yes [  ] No [ x ]    Collateral below has provided information that patient is/may be unaware of: Yes [  ] No [ x ]    Patient gives permission to obtain collateral from _____:  ( x ) Yes  (  )  No    NAME: Renan  NUMBER: [phone]  RELATIONSHIP:   RELIABILITY: Good  COMMENTS: Patient's  at bedside  ========================  PATIENT DEMOGRAPHICS:  ========================  HPI Pt is a 35 y/o female BIB  after being unable to sleep for 6 days and expressing paranoia. As per , he is unsure of what triggered pt's inability to sleep. Patient was not making sense and making statements that endorse paranoia. Pts  reports pt was speaking about people at a call center and saying they were trying to kill her. Pt woke her  up from a nap on another occasion and informed him "I know you were appointed to try to kill me." Pts david reports he went to the basement to try to get some space and pt came down to the basement 3 times and argued with him in which pts  left the home. When pt called her  and asked why he left, pts  informed her about their argument in the basment in which pt denies even going to the basement.  reports that he spoke to police who advised him to speak to an MD.  reports concerns mainly of lack of sleep and paranoia/erratic behaviors but does not currently feel like pt is a danger to herself or others.   BASELINE FUNCTIONING: See above  DATE HPI STARTED: 6 days ago  DECOMPENSATION: See HPI  SUICIDALITY None reported. No hx of self-injurious behaviors.   VIOLENCE: As per , he and pt get into arguments but no violence.   SUBSTANCE: None reported  ========================  PAST PSYCHIATRIC HISTORY  ========================  DATE PAST PSYCHIATRIC HISTORY STARTED: None  MAIN PSYCHIATRIC DIAGNOSIS: None  PSYCHIATRIC HOSPITALIZATIONS: None  PRIOR ILLNESS: None  SUICIDALITY: None reported  VIOLENCE: None reported  SUBSTANCE USE: None reported  ==============  OTHER HISTORY  ==============  CURRENT MEDICATION: None  MEDICAL HISTORY: Post partum 6 months  ALLERGIES None reported  LEGAL ISSUES: None reported  FIREARM ACCESS: None reported  SOCIAL HISTORY: Lives at home with  and 3 children  FAMILY HISTORY: None reported  DEVELOPMENTAL HISTORY: None reported

## 2024-05-04 NOTE — ED BEHAVIORAL HEALTH ASSESSMENT NOTE - NSSUICPROTFACT_PSY_ALL_CORE
Responsibility to children, family, or others/Identifies reasons for living/Supportive social network of family or friends/L[employer]-day beliefs

## 2024-05-04 NOTE — ED BEHAVIORAL HEALTH ASSESSMENT NOTE - DESCRIPTION
calm and cooperative    Vital Signs Last 24 Hrs  T(C): 36.9 (03 May 2024 23:48), Max: 36.9 (03 May 2024 23:48)  T(F): 98.4 (03 May 2024 23:48), Max: 98.4 (03 May 2024 23:48)  HR: 92 (03 May 2024 23:48) (70 - 92)  BP: 114/76 (03 May 2024 23:48) (114/76 - 136/90)  BP(mean): --  RR: 15 (03 May 2024 23:48) (14 - 18)  SpO2: 98% (03 May 2024 23:48) (96% - 100%)    Parameters below as of 03 May 2024 23:48  Patient On (Oxygen Delivery Method): room air Hypothyroidism (on Levothyroxine 75mcg) see HPI

## 2024-05-04 NOTE — ED BEHAVIORAL HEALTH PROGRESS NOTE - CASE SUMMARY/FORMULATION (CLEARLY DOCUMENT RATIONALE FOR DISPOSITION CHANGE)
34 year old female, domiciled with family, unemployed, with PMHx of Hypothyroidism, currently 6 months Post-partum, with no formal PPHx, no hx of psychiatric hospitalizations, no hx of SA, no hx of NSSIB, no hx of aggression/violence, denies any hx of trauma, denies any hx of substance use, referred by OBGYN for concern of psychosis. Patient reports insomnia for past 6 days, not sleeping more than 2 or so hrs. Patient reports associated low appetite, some dizziness, and feeling week. She reports also sometimes overthinking due to this as well, but denies racing thoughts or increased anxiety. She denies any other depressive symptoms, denies excessive anxiety, denies any hypomanic/manic symptoms, and denies any psychosis. Reports good bonding with her infant. Denies any past or current thoughts/urges of NSSIB, denies any past or current PSI/SI/IP/HI/AVH. Patient denies feeling like she is being poisoned or that people are out to get her or hurt her. Reports that recently she got into verbal argument with  because she was jealous and also feels that he may be cheating on her with multiple women ( denies this and attributes this to increased paranoia from insomnia).     On reassessment, patient was able to sleep through the night after receiving benadryl and has remained stable. She is calm, cooperative, linear, with appropriate affect. She does not appear acutely manic, depressed, psychotic, suicidal or homicidal. The period of acute stress and insomnia appear to have contributed to a brief episode of paranoia that has now since resolved and patient is future oriented and able to contract for safety. Her  does not express any acute safety concerns and feels she is safe to return back home. No psychiatric contraindications for discharge.

## 2024-05-04 NOTE — ED BEHAVIORAL HEALTH ASSESSMENT NOTE - REASON
to assess any emerging psychotic/paranoid symptoms given discrepancy in patient interview and collateral

## 2024-05-04 NOTE — ED BEHAVIORAL HEALTH PROGRESS NOTE - DETAILS
patient advised to return to ED or call 911 if symptoms worsen or thoughts to harm self or others occur.   aware

## 2024-05-04 NOTE — ED ADULT NURSE REASSESSMENT NOTE - NS ED NURSE REASSESS COMMENT FT1
Patient A&Ox4 with comfortable appearance. Denies pain or any other discomfort. Hemodynamically stable. Patient denies SI/HI, is inquiring about discharge. Patient advised psych is to re-eval this morning. Patient verbalized understanding. Spouse continues to be at bedside.

## 2024-05-04 NOTE — ED BEHAVIORAL HEALTH ASSESSMENT NOTE - SUMMARY
34 year old female, domiciled with family, unemployed, with PMHx of Hypothyroidism, currently 6 months Post-partum, with no formal PPHx, no hx of psychiatric hospitalizations, no hx of SA, no hx of NSSIB, no hx of aggression/violence, denies any hx of trauma, denies any hx of substance use, referred by OBGYN for concern of psychosis. Patient reports insomnia for past 6 days, not sleeping more than 2 or so hrs. Patient reports associated low appetite, some dizziness, and feeling week. She reports also sometimes overthinking due to this as well, but denies racing thoughts or increased anxiety. She denies any other depressive symptoms, denies excessive anxiety, denies any hypomanic/manic symptoms, and denies any psychosis. Reports good bonding with her infant. Denies any past or current thoughts/urges of NSSIB, denies any past or current PSI/SI/IP/HI/AVH. Patient denies feeling like she is being poisoned or that people are out to get her or hurt her. Reports that recently she got into verbal argument with  because she was jealous and also feels that he may be cheating on her with multiple women ( denies this and attributes this to increased paranoia from insomnia).     Plan  - Hold for reassessment; see if any psychotic symptoms emerge  - Consider giving unisom or equivalent for sleep.   - PRN for agitation: Haldol 5mg, Ativan 1mg, Benadryl 50mg, q6hr IM/PO 34 year old female, domiciled with family, unemployed, with PMHx of Hypothyroidism, currently 6 months Post-partum, with no formal PPHx, no hx of psychiatric hospitalizations, no hx of SA, no hx of NSSIB, no hx of aggression/violence, denies any hx of trauma, denies any hx of substance use, referred by OBGYN for concern of psychosis. Patient reports insomnia for past 6 days, not sleeping more than 2 or so hrs. Patient reports associated low appetite, some dizziness, and feeling week. She reports also sometimes overthinking due to this as well, but denies racing thoughts or increased anxiety. She denies any other depressive symptoms, denies excessive anxiety, denies any hypomanic/manic symptoms, and denies any psychosis. Reports good bonding with her infant. Denies any past or current thoughts/urges of NSSIB, denies any past or current PSI/SI/IP/HI/AVH. Patient denies feeling like she is being poisoned or that people are out to get her or hurt her. Reports that recently she got into verbal argument with  because she was jealous and also feels that he may be cheating on her with multiple women ( denies this and attributes this to increased paranoia from insomnia).     Plan  - Hold for reassessment; observe if any psychotic symptoms emerge. If patient stable in the morning, consider discharge with therapy resources.   - Consider giving unisom or equivalent for sleep. May consider discharge with short term supply of Unisom if patient takes and benefits.  - PRN for agitation: Haldol 5mg, Ativan 1mg, Benadryl 50mg, q6hr IM/PO

## 2024-05-04 NOTE — ED BEHAVIORAL HEALTH PROGRESS NOTE - RISK ASSESSMENT
Protective factors - Responsibility to children, family, no hx of SA  No known violence risk factors ot access to firearms   risk factors - anxiety, insomnia  Protective factors - future oriented, Responsibility to children, family, no hx of SA, no current HI  No known violence risk factors ot access to firearms

## 2024-05-04 NOTE — ED BEHAVIORAL HEALTH ASSESSMENT NOTE - PRIMARY DX
Other (Free Text): Referred to edith
Note Text (......Xxx Chief Complaint.): This diagnosis correlates with the
Detail Level: Zone
Insomnia, unspecified type

## 2024-05-04 NOTE — ED BEHAVIORAL HEALTH PROGRESS NOTE - DETAILS:
On interview patient states that she feels better compared to yesterday. Reports of an adequate amount of sleep last night after receiving Benadryl 25mg PO last night with an increased appetite this morning.   When asked to recall the reason for coming to the ED, patient states that she was feeling dizzy and fatigued secondary to insomnia x 6 days. Reports that OB, Dr. Joel, advised she come to ED. Currently denies symptoms of psychosis.   Upon asking what she will do if she were to be discharged, she states that she will hire a  to cook and help take care of her children. Patient believes that this will help to alleviate some of her stress at home.   When asked patient's interest in outpatient therapy and psychiatry, patient states she is not interested however will appreciate the resources.  She denies SI, HI, AH, visual hallucinations, paranoia or confusion. On interview patient states that she "feels better" compared to yesterday. Reports of an adequate amount of sleep last night after receiving Benadryl 25mg PO last night with an increased appetite this morning.   When asked to recall the reason for coming to the ED, patient states that she was feeling dizzy and fatigued secondary to insomnia x 6 days. Reports that OB, Dr. Joel, advised she come to ED. Currently denies symptoms of psychosis.   Upon asking what she will do if she were to be discharged, she states that she will hire a  to cook and help take care of her children. Patient believes that this will help to alleviate some of her stress at home.   When asked patient's interest in outpatient therapy and psychiatry, patient states she is not interested however will appreciate the resources.  She denies SI, HI, AH, visual hallucinations, paranoia or confusion.

## 2024-05-06 ENCOUNTER — NON-APPOINTMENT (OUTPATIENT)
Age: 34
End: 2024-05-06

## 2024-05-07 ENCOUNTER — TRANSCRIPTION ENCOUNTER (OUTPATIENT)
Age: 34
End: 2024-05-07

## 2024-05-08 ENCOUNTER — TRANSCRIPTION ENCOUNTER (OUTPATIENT)
Age: 34
End: 2024-05-08

## 2024-05-21 ENCOUNTER — NON-APPOINTMENT (OUTPATIENT)
Age: 34
End: 2024-05-21

## 2024-07-15 NOTE — ED ADULT TRIAGE NOTE - WEIGHT IN KG
Drink lots of fluids.  Rest.  Return to emergency department for worsening symptoms or any problems   57.2

## 2024-11-13 ENCOUNTER — APPOINTMENT (OUTPATIENT)
Dept: OBGYN | Facility: CLINIC | Age: 34
End: 2024-11-13
Payer: MEDICAID

## 2024-11-13 VITALS
HEART RATE: 93 BPM | BODY MASS INDEX: 26.11 KG/M2 | WEIGHT: 133 LBS | SYSTOLIC BLOOD PRESSURE: 121 MMHG | DIASTOLIC BLOOD PRESSURE: 79 MMHG | HEIGHT: 60 IN

## 2024-11-13 PROCEDURE — 99214 OFFICE O/P EST MOD 30 MIN: CPT
